# Patient Record
Sex: MALE | Race: WHITE | NOT HISPANIC OR LATINO | Employment: FULL TIME | URBAN - METROPOLITAN AREA
[De-identification: names, ages, dates, MRNs, and addresses within clinical notes are randomized per-mention and may not be internally consistent; named-entity substitution may affect disease eponyms.]

---

## 2019-09-26 ENCOUNTER — OFFICE VISIT (OUTPATIENT)
Dept: PEDIATRICS | Facility: CLINIC | Age: 44
End: 2019-09-26
Payer: COMMERCIAL

## 2019-09-26 VITALS
BODY MASS INDEX: 24.84 KG/M2 | TEMPERATURE: 98.1 F | WEIGHT: 163.9 LBS | OXYGEN SATURATION: 99 % | HEART RATE: 57 BPM | SYSTOLIC BLOOD PRESSURE: 176 MMHG | HEIGHT: 68 IN | DIASTOLIC BLOOD PRESSURE: 118 MMHG

## 2019-09-26 DIAGNOSIS — I10 ESSENTIAL HYPERTENSION: ICD-10-CM

## 2019-09-26 PROCEDURE — 99204 OFFICE O/P NEW MOD 45 MIN: CPT | Performed by: INTERNAL MEDICINE

## 2019-09-26 RX ORDER — AMLODIPINE BESYLATE 5 MG/1
5 TABLET ORAL DAILY
Qty: 30 TABLET | Refills: 1 | Status: SHIPPED | OUTPATIENT
Start: 2019-09-26 | End: 2019-10-24

## 2019-09-26 RX ORDER — LISINOPRIL 10 MG/1
10 TABLET ORAL DAILY
Qty: 30 TABLET | Refills: 1 | Status: SHIPPED | OUTPATIENT
Start: 2019-09-26 | End: 2019-10-24

## 2019-09-26 ASSESSMENT — MIFFLIN-ST. JEOR: SCORE: 1617.95

## 2019-09-26 NOTE — PATIENT INSTRUCTIONS
Patient Education     Low-Salt Diet  This diet removes foods that are high in salt. It also limits the amount of salt you use when cooking. It is most often used for people with high blood pressure, edema (fluid retention), and kidney, liver, or heart disease.  Table salt contains the mineral sodium. Your body needs sodium to work normally. But too much sodium can make your health problems worse. Your healthcare provider is recommending a low-salt (also called low-sodium) diet for you. Your total daily allowance of salt is 1,500 to 2,300 milligrams (mg). It is less than 1 teaspoon of table salt. This means you can have only about 500 to 700 mg of sodium at each meal. People with certain health problems should limit salt intake to the lower end of the recommended range.    When you cook, don t add much salt. If you can cook without using salt, even better. Don t add salt to your food at the table.  When shopping, read food labels. Salt is often called sodium on the label. Choose foods that are salt-free, low salt, or very low salt. Note that foods with reduced salt may not lower your salt intake enough.    Beans, potatoes, and pasta  Ok: Dry beans, split peas, lentils, potatoes, rice, macaroni, pasta, spaghetti without added salt  Avoid: Potato chips, tortilla chips, and similar products  Breads and cereals  Ok: Low-sodium breads, rolls, cereals, and cakes; low-salt crackers, matzo crackers  Avoid: Salted crackers, pretzels, popcorn, [place] toast, pancakes, muffins  Dairy  Ok: Milk, chocolate milk, hot chocolate mix, low-salt cheeses, and yogurt  Avoid: Processed cheese and cheese spreads; Roquefort, Camembert, and cottage cheese; buttermilk, instant breakfast drink  Desserts  Ok: Ice cream, frozen yogurt, juice bars, gelatin, cookies and pies, sugar, honey, jelly, hard candy  Avoid: Most pies, cakes and cookies prepared or processed with salt; instant pudding  Drinks  Ok: Tea, coffee, fizzy (carbonated) drinks,  juices  Avoid: Flavored coffees, electrolyte replacement drinks, sports drinks  Meats  Ok: All fresh meat, fish, poultry, low-salt tuna, eggs, egg substitute  Avoid: Smoked, pickled, brine-cured, or salted meats and fish. This includes eng, chipped beef, corned beef, hot dogs, deli meats, ham, kosher meats, salt pork, sausage, canned tuna, salted codfish, smoked salmon, herring, sardines, or anchovies.  Seasonings and spices  Ok: Most seasonings are okay. Good substitutes for salt include: fresh herb blends, hot sauce, lemon, garlic, morrow, vinegar, dry mustard, parsley, cilantro, horseradish, tomato paste, regular margarine, mayonnaise, unsalted butter, cream cheese, vegetable oil, cream, low-salt salad dressing and gravy.  Avoid: Regular ketchup, relishes, pickles, soy sauce, teriyaki sauce, Worcestershire sauce, BBQ sauce, tartar sauce, meat tenderizer, chili sauce, regular gravy, regular salad dressing, salted butter  Soups  Ok: Low-salt soups and broths made with allowed foods  Avoid: Bouillon cubes, soups with smoked or salted meats, regular soup and broth  Vegetables  Ok: Most vegetables are okay; also low-salt tomato and vegetable juices  Avoid: Sauerkraut and other brine-soaked vegetables; pickles and other pickled vegetables; tomato juice, olives  Date Last Reviewed: 8/1/2016 2000-2018 [employer] [address] [place] [id number]. All rights reserved. This information is not intended as a substitute for professional medical care. Always follow your healthcare professional's instructions.

## 2019-09-26 NOTE — PROGRESS NOTES
Headaches  Blood pressure concerns    Jurgen Morris is a 44 year old male who presents to clinic today for the following health issues:    HPI   Patient does not have a PCP.  Patient is complaining of headaches off/on since 19 when he was seen at the [facility] ER for nausea, vomiting and diarrhea.  Patients BP readings were 159/118, 161/110 and 192/101.  Patient has been checking his BP readings at home for the last 2 days and BP readings around 150/105.  Has been taking Advil for the headaches, patient states the headaches are very low key, just annoying, occipital area    Patient indicates that 2 years ago when it seen a physician the blood pressure was borderline.  He does not smoke and drinks beer anywhere from 5-8 beers a week.  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  He has some stressors at home.  His girlfriend has 2 special needs children which is at times texting.  He does not follow a low-salt diet.  Denies chest pain, shortness of breath, dizziness or lightheadedness.    Patient Active Problem List   Diagnosis     Essential hypertension     History reviewed. No pertinent surgical history.    Social History     Tobacco Use     Smoking status: Former Smoker     Packs/day: 0.00     Last attempt to quit:      Years since quittin.7     Smokeless tobacco: Never Used     Tobacco comment: Down to 1 cigarette year/lightsmoker 10 years ago   Substance Use Topics     Alcohol use: Yes     Alcohol/week: 5.0 - 8.0 standard drinks     Types: 5 - 8 Cans of beer per week     Family History   Problem Relation Age of Onset     Anxiety Disorder Mother      Anxiety Disorder Father      Hypertension Father      Anxiety Disorder Brother      Anxiety Disorder Sister          Current Outpatient Medications   Medication Sig Dispense Refill     amLODIPine (NORVASC) 5 MG tablet Take 1 tablet (5 mg) by mouth daily 30 tablet 1     lisinopril (PRINIVIL/ZESTRIL) 10 MG tablet Take 1 tablet (10 mg) by  "mouth daily 30 tablet 1     No Known Allergies  BP Readings from Last 3 Encounters:   09/26/19 (!) 176/118    Wt Readings from Last 3 Encounters:   09/26/19 74.3 kg (163 lb 14.4 oz)                      Reviewed and updated as needed this visit by Provider  Tobacco  Allergies  Meds  Problems  Med Hx  Surg Hx  Fam Hx  Soc Hx          Review of Systems   ROS COMP: Constitutional, HEENT, cardiovascular, pulmonary, GI, , musculoskeletal, neuro, skin, endocrine and psych systems are negative, except as otherwise noted.      Objective    BP (!) 176/118 (BP Location: Left arm, Patient Position: Sitting, Cuff Size: Adult Regular)   Pulse 57   Temp 98.1  F (36.7  C) (Temporal)   Ht 1.727 m (5' 8\")   Wt 74.3 kg (163 lb 14.4 oz)   SpO2 99%   BMI 24.92 kg/m    Body mass index is 24.92 kg/m .  Physical Exam   GENERAL: healthy, alert and no distress  NECK: no adenopathy, no asymmetry, masses, or scars and thyroid normal to palpation  RESP: lungs clear to auscultation - no rales, rhonchi or wheezes  CV: regular rate and rhythm, normal S1 S2, no S3 or S4, no murmur, click or rub, no peripheral edema and peripheral pulses strong  ABDOMEN: soft, nontender, no hepatosplenomegaly, no masses and bowel sounds normal  MS: no gross musculoskeletal defects noted, no edema    Diagnostic Test Results:  Labs reviewed in Epic    Reviewed lab in care everywhere.  He had an EKG on 9/2/2019 at [facility] showing sinus bradycardia with first-degree AV block.  On 8/30/2019 he had lab which showed normal renal function with creatinine 0.94 and normal electrolytes.  His hemoglobin was 16.2.    Assessment & Plan     1.  Essential hypertension stage II.  I recommend starting amlodipine 5 mg a day and lisinopril 10 mg a day.  Side effects of these drugs were discussed particularly tickling cough and angioedema with ACE inhibitors.  Advised to return for follow-up in a month with fasting BMP and lipids.  2.  Headache most likely related to " "uncontrolled hypertension.  Advised to use Tylenol and avoid nonsteroidal anti-inflammatory medication.    Discussed low-salt diet.  Information provided.     BMI:   Estimated body mass index is 24.92 kg/m  as calculated from the following:    Height as of this encounter: 1.727 m (5' 8\").    Weight as of this encounter: 74.3 kg (163 lb 14.4 oz).               No follow-ups on file.    Carter Humphreys MD  [facility]      "

## 2019-10-24 ENCOUNTER — OFFICE VISIT (OUTPATIENT)
Dept: PEDIATRICS | Facility: CLINIC | Age: 44
End: 2019-10-24
Payer: COMMERCIAL

## 2019-10-24 VITALS
DIASTOLIC BLOOD PRESSURE: 82 MMHG | HEIGHT: 68 IN | BODY MASS INDEX: 24.9 KG/M2 | TEMPERATURE: 97.7 F | HEART RATE: 62 BPM | WEIGHT: 164.3 LBS | SYSTOLIC BLOOD PRESSURE: 128 MMHG

## 2019-10-24 DIAGNOSIS — I10 ESSENTIAL HYPERTENSION: ICD-10-CM

## 2019-10-24 DIAGNOSIS — G44.219 EPISODIC TENSION-TYPE HEADACHE, NOT INTRACTABLE: ICD-10-CM

## 2019-10-24 PROBLEM — G44.209 TENSION TYPE HEADACHE: Status: ACTIVE | Noted: 2019-10-24

## 2019-10-24 LAB
ANION GAP SERPL CALCULATED.3IONS-SCNC: 5 MMOL/L (ref 3–14)
BUN SERPL-MCNC: 17 MG/DL (ref 7–30)
CALCIUM SERPL-MCNC: 9 MG/DL (ref 8.5–10.1)
CHLORIDE SERPL-SCNC: 107 MMOL/L (ref 94–109)
CHOLEST SERPL-MCNC: 209 MG/DL
CO2 SERPL-SCNC: 27 MMOL/L (ref 20–32)
CREAT SERPL-MCNC: 0.84 MG/DL (ref 0.66–1.25)
GFR SERPL CREATININE-BSD FRML MDRD: >90 ML/MIN/{1.73_M2}
GLUCOSE SERPL-MCNC: 101 MG/DL (ref 70–99)
HDLC SERPL-MCNC: 50 MG/DL
LDLC SERPL CALC-MCNC: 142 MG/DL
NONHDLC SERPL-MCNC: 159 MG/DL
POTASSIUM SERPL-SCNC: 4.5 MMOL/L (ref 3.4–5.3)
SODIUM SERPL-SCNC: 139 MMOL/L (ref 133–144)
TRIGL SERPL-MCNC: 83 MG/DL

## 2019-10-24 PROCEDURE — 99214 OFFICE O/P EST MOD 30 MIN: CPT | Performed by: INTERNAL MEDICINE

## 2019-10-24 PROCEDURE — 80048 BASIC METABOLIC PNL TOTAL CA: CPT | Performed by: INTERNAL MEDICINE

## 2019-10-24 PROCEDURE — 80061 LIPID PANEL: CPT | Performed by: INTERNAL MEDICINE

## 2019-10-24 PROCEDURE — 36415 COLL VENOUS BLD VENIPUNCTURE: CPT | Performed by: INTERNAL MEDICINE

## 2019-10-24 RX ORDER — AMLODIPINE BESYLATE 5 MG/1
5 TABLET ORAL DAILY
Qty: 90 TABLET | Refills: 3 | Status: SHIPPED | OUTPATIENT
Start: 2019-10-24 | End: 2020-10-15

## 2019-10-24 RX ORDER — LISINOPRIL 10 MG/1
10 TABLET ORAL DAILY
Qty: 90 TABLET | Refills: 3 | Status: SHIPPED | OUTPATIENT
Start: 2019-10-24 | End: 2020-10-15

## 2019-10-24 ASSESSMENT — MIFFLIN-ST. JEOR: SCORE: 1619.76

## 2019-10-24 NOTE — PROGRESS NOTES
Subjective     Jurgen Morris is a 44 year old male who presents to clinic today for the following health issues:    HPI   Hypertension Follow-up      Do you check your blood pressure regularly outside of the clinic? Yes     Are you following a low salt diet? Yes    Are your blood pressures ever more than 140 on the top number (systolic) OR more   than 90 on the bottom number (diastolic), for example 140/90? Yes, 140/90 has been about the highest BP reading at home      How many servings of fruits and vegetables do you eat daily?  1-2    On average, how many sweetened beverages do you drink each day (soda, juice, sweet tea, etc)?   1    How many days per week do you miss taking your medication? 0      Patient comes in for follow-up on hypertension.  He is doing much better.  Blood pressure readings at home have been good.  He is tolerating amlodipine and lisinopril very well.  The muscular tension type neck and occipital region headache has improved though not completely resolved.  Tylenol did not help so has been using Excedrin.  The frequency is much reduced and now may be just once or twice a week.  Headache is not severe.  Denies chest pain or shortness of breath.      Patient Active Problem List   Diagnosis     Essential hypertension     Tension type headache     History reviewed. No pertinent surgical history.    Social History     Tobacco Use     Smoking status: Former Smoker     Packs/day: 0.00     Last attempt to quit:      Years since quittin.8     Smokeless tobacco: Never Used     Tobacco comment: Down to 1 cigarette year/lightsmoker 10 years ago   Substance Use Topics     Alcohol use: Yes     Alcohol/week: 5.0 - 8.0 standard drinks     Types: 5 - 8 Cans of beer per week     Family History   Problem Relation Age of Onset     Anxiety Disorder Mother      Anxiety Disorder Father      Hypertension Father      Anxiety Disorder Brother      Anxiety Disorder Sister          Current Outpatient  "Medications   Medication Sig Dispense Refill     amLODIPine (NORVASC) 5 MG tablet Take 1 tablet (5 mg) by mouth daily 90 tablet 3     lisinopril (PRINIVIL/ZESTRIL) 10 MG tablet Take 1 tablet (10 mg) by mouth daily 90 tablet 3     No Known Allergies  BP Readings from Last 3 Encounters:   10/24/19 128/82   09/26/19 (!) 176/118    Wt Readings from Last 3 Encounters:   10/24/19 74.5 kg (164 lb 4.8 oz)   09/26/19 74.3 kg (163 lb 14.4 oz)                      Reviewed and updated as needed this visit by Provider         Review of Systems   ROS COMP: Constitutional, HEENT, cardiovascular, pulmonary, GI, , musculoskeletal, neuro, skin, endocrine and psych systems are negative, except as otherwise noted.      Objective    /82 (BP Location: Right arm, Patient Position: Sitting, Cuff Size: Adult Regular)   Pulse 62   Temp 97.7  F (36.5  C) (Temporal)   Ht 1.727 m (5' 8\")   Wt 74.5 kg (164 lb 4.8 oz)   BMI 24.98 kg/m    Body mass index is 24.98 kg/m .  Physical Exam   GENERAL: healthy, alert and no distress  NECK: no adenopathy, no asymmetry, masses, or scars and thyroid normal to palpation  RESP: lungs clear to auscultation - no rales, rhonchi or wheezes  CV: regular rate and rhythm, normal S1 S2, no S3 or S4, no murmur, click or rub, no peripheral edema and peripheral pulses strong  ABDOMEN: soft, nontender, no hepatosplenomegaly, no masses and bowel sounds normal  MS: no gross musculoskeletal defects noted, no edema    Diagnostic Test Results:  Labs reviewed in Epic  Results for orders placed or performed in visit on 10/24/19 (from the past 24 hour(s))   Basic metabolic panel   Result Value Ref Range    Sodium 139 133 - 144 mmol/L    Potassium 4.5 3.4 - 5.3 mmol/L    Chloride 107 94 - 109 mmol/L    Carbon Dioxide 27 20 - 32 mmol/L    Anion Gap 5 3 - 14 mmol/L    Glucose 101 (H) 70 - 99 mg/dL    Urea Nitrogen 17 7 - 30 mg/dL    Creatinine 0.84 0.66 - 1.25 mg/dL    GFR Estimate >90 >60 mL/min/[1.73_m2]    GFR " "Estimate If Black >90 >60 mL/min/[1.73_m2]    Calcium 9.0 8.5 - 10.1 mg/dL   Lipid Profile   Result Value Ref Range    Cholesterol 209 (H) <200 mg/dL    Triglycerides 83 <150 mg/dL    HDL Cholesterol 50 >39 mg/dL    LDL Cholesterol Calculated 142 (H) <100 mg/dL    Non HDL Cholesterol 159 (H) <130 mg/dL           Assessment & Plan     1.  Essential hypertension under good control with combination of amlodipine 5 mg a day and lisinopril 10 mg a day.  Lab performed today looks good advised to continue the same.  Return for follow-up in 6 months.  2.  Borderline dyslipidemia with cholesterol 209, HDL 50 and .  Low-fat low-cholesterol diet discussed.  3.  Tension type headache.  Its infrequent now and he will use Excedrin on a as needed basis.     BMI:   Estimated body mass index is 24.98 kg/m  as calculated from the following:    Height as of this encounter: 1.727 m (5' 8\").    Weight as of this encounter: 74.5 kg (164 lb 4.8 oz).               Return in about 6 months (around 4/24/2020) for Routine Visit.    Carter Humphreys MD  [facility]      "

## 2020-04-23 ENCOUNTER — VIRTUAL VISIT (OUTPATIENT)
Dept: PEDIATRICS | Facility: CLINIC | Age: 45
End: 2020-04-23
Payer: COMMERCIAL

## 2020-04-23 DIAGNOSIS — I10 ESSENTIAL HYPERTENSION: Primary | ICD-10-CM

## 2020-04-23 PROCEDURE — 99213 OFFICE O/P EST LOW 20 MIN: CPT | Mod: 95 | Performed by: INTERNAL MEDICINE

## 2020-04-23 NOTE — PROGRESS NOTES
"Jurgen Morris is a 44 year old male who is being evaluated via a billable video visit.      The patient has been notified of following:     \"This video visit will be conducted via a call between you and your physician/provider. We have found that certain health care needs can be provided without the need for an in-person physical exam.  This service lets us provide the care you need with a video conversation.  If a prescription is necessary we can send it directly to your pharmacy.  If lab work is needed we can place an order for that and you can then stop by our lab to have the test done at a later time.    Video visits are billed at different rates depending on your insurance coverage.  Please reach out to your insurance provider with any questions.    If during the course of the call the physician/provider feels a video visit is not appropriate, you will not be charged for this service.\"    Patient has given verbal consent for Video visit? Yes    How would you like to obtain your AVS? Mail a copy    Patient would like the video invitation sent by: Text to cell phone: [phone]    Will anyone else be joining your video visit? No      Subjective     Jurgen Morris is a 44 year old male who presents to clinic today for the following health issues:    HPI  Hypertension Follow-up      Do you check your blood pressure regularly outside of the clinic? Yes     Are you following a low salt diet? Yes    Are your blood pressures ever more than 140 on the top number (systolic) OR more   than 90 on the bottom number (diastolic), for example 140/90? Yes      How many servings of fruits and vegetables do you eat daily?  0-1    On average, how many sweetened beverages do you drink each day (Examples: soda, juice, sweet tea, etc.  Do NOT count diet or artificially sweetened beverages)?   1    How many days per week do you exercise enough to make your heart beat faster? 5    How many minutes a day do you exercise " enough to make your heart beat faster? 60 or more  How many days per week do you miss taking your medication? 1    What makes it hard for you to take your medications?  remembering to take     Patient with history of hypertension has been doing well. Checks BP at home every other week. BP around 130/80's on average. No headache. No SOB or chest pain. No leg edema. Takes medication as prescribed/        Video Start Time: 8:34 am        Patient Active Problem List   Diagnosis     Essential hypertension     Tension type headache     History reviewed. No pertinent surgical history.    Social History     Tobacco Use     Smoking status: Former Smoker     Packs/day: 0.00     Last attempt to quit:      Years since quittin.3     Smokeless tobacco: Never Used     Tobacco comment: Down to 1 cigarette year/lightsmoker 10 years ago   Substance Use Topics     Alcohol use: Yes     Alcohol/week: 5.0 - 8.0 standard drinks     Types: 5 - 8 Cans of beer per week     Family History   Problem Relation Age of Onset     Anxiety Disorder Mother      Anxiety Disorder Father      Hypertension Father      Anxiety Disorder Brother      Anxiety Disorder Sister          Current Outpatient Medications   Medication Sig Dispense Refill     amLODIPine (NORVASC) 5 MG tablet Take 1 tablet (5 mg) by mouth daily 90 tablet 3     lisinopril (PRINIVIL/ZESTRIL) 10 MG tablet Take 1 tablet (10 mg) by mouth daily 90 tablet 3     No Known Allergies  BP Readings from Last 3 Encounters:   10/24/19 128/82   19 (!) 176/118    Wt Readings from Last 3 Encounters:   10/24/19 74.5 kg (164 lb 4.8 oz)   19 74.3 kg (163 lb 14.4 oz)                    Reviewed and updated as needed this visit by Provider         Review of Systems   ROS COMP: Constitutional, HEENT, cardiovascular, pulmonary, GI, , musculoskeletal, neuro, skin, endocrine and psych systems are negative, except as otherwise noted.      Objective    There were no vitals taken for this  "visit.  Estimated body mass index is 24.98 kg/m  as calculated from the following:    Height as of 10/24/19: 1.727 m (5' 8\").    Weight as of 10/24/19: 74.5 kg (164 lb 4.8 oz).  Physical Exam     GENERAL: healthy, alert and no distress  EYES: Eyes grossly normal to inspection, conjunctivae and sclerae normal  RESP: no audible wheeze, cough, or visible cyanosis.  No visible retractions or increased work of breathing.  Able to speak fully in complete sentences.  NEURO: Cranial nerves grossly intact, mentation intact and speech normal  PSYCH: mentation appears normal, affect normal/bright, judgement and insight intact, normal speech and appearance well-groomed      Diagnostic Test Results:  Labs reviewed in Epic        Assessment & Plan     1. Essential hypertension - controled.  Advice follow-up in October with BMP and Lipids.            No follow-ups on file.    Carter Humphreys MD  [facility]      Video-Visit Details    Type of service:  Video Visit    Video End Time:8:44 am    Originating Location (pt. Location): Home    Distant Location (provider location):  [facility]     Mode of Communication:  Video Conference via [employer]    No follow-ups on file.       Carter Humphreys MD      "

## 2020-10-15 ENCOUNTER — OFFICE VISIT (OUTPATIENT)
Dept: PEDIATRICS | Facility: CLINIC | Age: 45
End: 2020-10-15
Payer: COMMERCIAL

## 2020-10-15 VITALS
WEIGHT: 172.5 LBS | SYSTOLIC BLOOD PRESSURE: 136 MMHG | OXYGEN SATURATION: 95 % | DIASTOLIC BLOOD PRESSURE: 92 MMHG | TEMPERATURE: 97.5 F | HEART RATE: 63 BPM | BODY MASS INDEX: 26.23 KG/M2

## 2020-10-15 DIAGNOSIS — I10 ESSENTIAL HYPERTENSION: ICD-10-CM

## 2020-10-15 DIAGNOSIS — E78.00 MILD HYPERCHOLESTEROLEMIA: ICD-10-CM

## 2020-10-15 LAB
ANION GAP SERPL CALCULATED.3IONS-SCNC: 1 MMOL/L (ref 3–14)
BUN SERPL-MCNC: 18 MG/DL (ref 7–30)
CALCIUM SERPL-MCNC: 9.1 MG/DL (ref 8.5–10.1)
CHLORIDE SERPL-SCNC: 109 MMOL/L (ref 94–109)
CHOLEST SERPL-MCNC: 234 MG/DL
CO2 SERPL-SCNC: 30 MMOL/L (ref 20–32)
CREAT SERPL-MCNC: 0.84 MG/DL (ref 0.66–1.25)
GFR SERPL CREATININE-BSD FRML MDRD: >90 ML/MIN/{1.73_M2}
GLUCOSE SERPL-MCNC: 101 MG/DL (ref 70–99)
HDLC SERPL-MCNC: 48 MG/DL
LDLC SERPL CALC-MCNC: 162 MG/DL
NONHDLC SERPL-MCNC: 186 MG/DL
POTASSIUM SERPL-SCNC: 4.5 MMOL/L (ref 3.4–5.3)
SODIUM SERPL-SCNC: 140 MMOL/L (ref 133–144)
TRIGL SERPL-MCNC: 120 MG/DL

## 2020-10-15 PROCEDURE — 99213 OFFICE O/P EST LOW 20 MIN: CPT | Performed by: INTERNAL MEDICINE

## 2020-10-15 PROCEDURE — 36415 COLL VENOUS BLD VENIPUNCTURE: CPT | Performed by: INTERNAL MEDICINE

## 2020-10-15 PROCEDURE — 80048 BASIC METABOLIC PNL TOTAL CA: CPT | Performed by: INTERNAL MEDICINE

## 2020-10-15 PROCEDURE — 80061 LIPID PANEL: CPT | Performed by: INTERNAL MEDICINE

## 2020-10-15 RX ORDER — LISINOPRIL 10 MG/1
10 TABLET ORAL DAILY
Qty: 90 TABLET | Refills: 3 | Status: SHIPPED | OUTPATIENT
Start: 2020-10-15 | End: 2021-12-23

## 2020-10-15 RX ORDER — AMLODIPINE BESYLATE 5 MG/1
5 TABLET ORAL DAILY
Qty: 90 TABLET | Refills: 3 | Status: SHIPPED | OUTPATIENT
Start: 2020-10-15 | End: 2021-11-19

## 2020-10-15 NOTE — PROGRESS NOTES
Subjective     Jurgen Morris is a 45 year old male who presents to clinic today for the following health issues:    HPI         Hypertension Follow-up      Do you check your blood pressure regularly outside of the clinic? Yes     Are you following a low salt diet? Yes    Are your blood pressures ever more than 140 on the top number (systolic) OR more   than 90 on the bottom number (diastolic), for example 140/90? At times      How many servings of fruits and vegetables do you eat daily?  0-1    On average, how many sweetened beverages do you drink each day (Examples: soda, juice, sweet tea, etc.  Do NOT count diet or artificially sweetened beverages)?   1    How many days per week do you exercise enough to make your heart beat faster? 5    How many minutes a day do you exercise enough to make your heart beat faster? 60 or more  How many days per week do you miss taking your medication? Occasional will miss    What makes it hard for you to take your medications?  rushing out the door    The patient has come in for follow-up on hypertension.  He also has borderline hypercholesterolemia.  He indicates he is trying to follow a low-salt diet.  He confesses that occasionally he will forget to take his medication.  He has not taken his medication today morning because he was having lab but plans to take it after the clinic visit today.  Yesterday he forgot to take his blood pressure medication.  He denies chest pain, shortness of breath dizziness or lightheadedness.  Offers no other concerns.    Review of Systems   Constitutional, HEENT, cardiovascular, pulmonary, GI, , musculoskeletal, neuro, skin, endocrine and psych systems are negative, except as otherwise noted.      Objective    BP (!) 136/92 (BP Location: Right arm, Patient Position: Sitting, Cuff Size: Adult Regular)   Pulse 63   Temp 97.5  F (36.4  C) (Temporal)   Wt 78.2 kg (172 lb 8 oz)   SpO2 95%   BMI 26.23 kg/m    Body mass index is 26.23  kg/m .  Physical Exam   GENERAL: healthy, alert and no distress  NECK: no adenopathy, no asymmetry, masses, or scars and thyroid normal to palpation  RESP: lungs clear to auscultation - no rales, rhonchi or wheezes  CV: regular rate and rhythm, normal S1 S2, no S3 or S4, no murmur, click or rub, no peripheral edema and peripheral pulses strong  ABDOMEN: soft, nontender, no hepatosplenomegaly, no masses and bowel sounds normal  MS: no gross musculoskeletal defects noted, no edema    Results for orders placed or performed in visit on 10/15/20 (from the past 24 hour(s))   Lipid Profile   Result Value Ref Range    Cholesterol 234 (H) <200 mg/dL    Triglycerides 120 <150 mg/dL    HDL Cholesterol 48 >39 mg/dL    LDL Cholesterol Calculated 162 (H) <100 mg/dL    Non HDL Cholesterol 186 (H) <130 mg/dL   Basic metabolic panel   Result Value Ref Range    Sodium 140 133 - 144 mmol/L    Potassium 4.5 3.4 - 5.3 mmol/L    Chloride 109 94 - 109 mmol/L    Carbon Dioxide 30 20 - 32 mmol/L    Anion Gap 1 (L) 3 - 14 mmol/L    Glucose 101 (H) 70 - 99 mg/dL    Urea Nitrogen 18 7 - 30 mg/dL    Creatinine 0.84 0.66 - 1.25 mg/dL    GFR Estimate >90 >60 mL/min/[1.73_m2]    GFR Estimate If Black >90 >60 mL/min/[1.73_m2]    Calcium 9.1 8.5 - 10.1 mg/dL           Assessment & Plan     1.  Essential hypertension been treated with lisinopril 10 mg a day and amlodipine 5 mg a day.  Discussed the importance of taking the medication on a daily basis and not skipping any.  Low-salt diet discussed.  Advised to monitor blood pressure and return for follow-up in 6 months.  2.  Mild hypercholesterolemia.  The cholesterol profile is as stated above.  The total cholesterol and LDL has gone up a little bit from last year.  ASCVD ten-year risk calculation was 2.4%.  So I recommend that he follow a low-fat low-cholesterol diet and recheck in a year.            Return in about 6 months (around 4/15/2021).    Carter Humphreys MD  [facility]  [facility]

## 2022-01-19 ENCOUNTER — MYC MEDICAL ADVICE (OUTPATIENT)
Dept: FAMILY MEDICINE | Facility: CLINIC | Age: 47
End: 2022-01-19
Payer: COMMERCIAL

## 2022-01-31 ENCOUNTER — OFFICE VISIT (OUTPATIENT)
Dept: FAMILY MEDICINE | Facility: CLINIC | Age: 47
End: 2022-01-31
Payer: COMMERCIAL

## 2022-01-31 VITALS
DIASTOLIC BLOOD PRESSURE: 78 MMHG | BODY MASS INDEX: 25.64 KG/M2 | TEMPERATURE: 97.7 F | SYSTOLIC BLOOD PRESSURE: 110 MMHG | HEART RATE: 68 BPM | WEIGHT: 169.2 LBS | OXYGEN SATURATION: 97 % | HEIGHT: 68 IN | RESPIRATION RATE: 14 BRPM

## 2022-01-31 DIAGNOSIS — I10 ESSENTIAL HYPERTENSION: Primary | ICD-10-CM

## 2022-01-31 LAB
ANION GAP SERPL CALCULATED.3IONS-SCNC: 4 MMOL/L (ref 3–14)
BUN SERPL-MCNC: 14 MG/DL (ref 7–30)
CALCIUM SERPL-MCNC: 8.7 MG/DL (ref 8.5–10.1)
CHLORIDE BLD-SCNC: 108 MMOL/L (ref 94–109)
CO2 SERPL-SCNC: 27 MMOL/L (ref 20–32)
CREAT SERPL-MCNC: 1.01 MG/DL (ref 0.66–1.25)
GFR SERPL CREATININE-BSD FRML MDRD: >90 ML/MIN/1.73M2
GLUCOSE BLD-MCNC: 103 MG/DL (ref 70–99)
POTASSIUM BLD-SCNC: 4.1 MMOL/L (ref 3.4–5.3)
SODIUM SERPL-SCNC: 139 MMOL/L (ref 133–144)

## 2022-01-31 PROCEDURE — 99213 OFFICE O/P EST LOW 20 MIN: CPT | Performed by: PHYSICIAN ASSISTANT

## 2022-01-31 PROCEDURE — 36415 COLL VENOUS BLD VENIPUNCTURE: CPT | Performed by: PHYSICIAN ASSISTANT

## 2022-01-31 PROCEDURE — 80048 BASIC METABOLIC PNL TOTAL CA: CPT | Performed by: PHYSICIAN ASSISTANT

## 2022-01-31 RX ORDER — AMLODIPINE BESYLATE 10 MG/1
10 TABLET ORAL DAILY
Qty: 90 TABLET | Refills: 3 | Status: SHIPPED | OUTPATIENT
Start: 2022-01-31 | End: 2024-01-09

## 2022-01-31 RX ORDER — LISINOPRIL 10 MG/1
10 TABLET ORAL DAILY
Qty: 90 TABLET | Refills: 3 | Status: SHIPPED | OUTPATIENT
Start: 2022-01-31 | End: 2023-03-09

## 2022-01-31 ASSESSMENT — MIFFLIN-ST. JEOR: SCORE: 1629.99

## 2022-01-31 ASSESSMENT — PAIN SCALES - GENERAL: PAINLEVEL: NO PAIN (0)

## 2022-01-31 NOTE — PATIENT INSTRUCTIONS
Anna Seaman,    Thank you for allowing [facility] to manage your care.    Your blood pressure was high today. Get a blood pressure cuff for use at home. Monitor it daily for two weeks and record your readings. If your blood pressure is greater than or equal to 140/90mm Hg more than half of the time, please schedule an appointment with us for a recheck.    I ordered some lab work, please go to the laboratory to get your studies.    I sent your prescriptions to your pharmacy.    Drink 8-10 glasses of fluid daily to stay well-hydrated.    Please allow 1-2 business days for our office to contact you in regards to your laboratory/radiological studies.  If not done so, I encourage you to login into JOOR ([email or web address] to review your results as well.     If you have any questions or concerns, please feel free to call us at [phone]    Sincerely,    Remi Swift PA-C    Did you know?      You can schedule a video visit for follow-up appointments as well as future appointments for certain conditions.  Please see the below link.     [email or web address]    If you have not already done so,  I encourage you to sign up for JOOR ([email or web address]).  This will allow you to review your results, securely communicate with a provider, and schedule virtual visits as well.      Patient Education     Eating Heart-Healthy Foods  Eating has a big impact on your heart health. In fact, eating healthier can improve several of your heart risks at once. For instance, it helps you manage weight, cholesterol, and blood pressure. Here are ideas to help you make heart-healthy changes without giving up all the foods and flavors you love.   Getting started    Talk with your healthcare provider about eating plans, such as the DASH or Mediterranean diet. You may also be referred to a dietitian.    Change a few things at a time. Give yourself time  to get used to a few eating changes before adding more.    Work to create a tasty, healthy eating plan that you can stick to for the rest of your life.    Goals for healthy eating  Below are some tips to improve your eating habits:     Limit saturated fats and trans fats. Saturated fats raise your levels of cholesterol, so keep these fats to a minimum. They are found in foods such as fatty meats, whole milk, cheese, and palm and coconut oils. Avoid trans fats because they lower good cholesterol as well as raise bad cholesterol. Trans fats are most often found in processed foods, such as pastries, cookies, pies, muffins, fried foods, stick margarines, and shortening.    Reduce how much sodium (salt) you have. Eating too much salt may increase your blood pressure. Limit your sodium intake to 2,300 milligrams (mg) per day (the amount in 1 teaspoon of salt), or less if your healthcare provider recommends it. Dining out less often and eating fewer processed foods are two great ways to decrease the amount of salt you consume. At home, flavor your foods with other spices and herbs instead of salt.    Managing calories. A calorie is a unit of energy. Your body burns calories for fuel, but if you eat more calories than your body burns, the extras are stored as fat. Your healthcare provider can help you create a diet plan to manage your calories. This will likely include eating healthier foods and getting regular exercise. To help you track your progress, keep a diary to record what you eat and how often you exercise.  Choose the right foods  Aim to make these foods staples of your diet. If you have diabetes, you may have different recommendations than what is listed here:     Fruits and vegetables provide plenty of nutrients without a lot of calories. At meals, fill half your plate with these foods. Choose between fresh, frozen, canned, or dried without added sauces, salt, or sugars. Split the other half of your plate  between whole grains and lean protein.    Whole grains are high in fiber and rich in vitamins and nutrients. Good choices include whole wheat bread, pasta, oats, and brown rice. Make at least half of your grains whole grains.    Lean proteins give you nutrition with less fat. Good choices include fish, skinless chicken and turkey, and beans. Draining the fat from cooked ground meat is another way to reduce the amount of fat you eat.    Low-fat and nonfat dairy provide nutrients without a lot of fat. Try low-fat or nonfat milk, cheese, or yogurt.    Healthy fats can be good for you in small amounts. These are unsaturated fats, such as olive oil, nuts, and fish. Try to have at least 2 servings per week of fatty fish, such as salmon, sardines, mackerel, rainbow trout, and albacore tuna. These contain omega-3 fatty acids, which are good for your heart. Flaxseed and walnuts are other sources of heart-healthy fats.  More on heart-healthy eating  Read food labels  Healthy eating starts at the grocery store. Be sure to pay attention to food labels on packaged foods. Look for products that are high in fiber and protein, and low in saturated fat, added sugars, and sodium. Avoid products that contain trans fat. And pay close attention to serving size. For instance, if you plan to eat two servings, double all the numbers on the label.   Prepare food right  A key part of healthy cooking is cutting down on added fat, sugar and salt. Look on the internet for lower-fat, lower-sodium recipes without a lot of added sugars. Also try these tips:     Remove fat from meat and skin from poultry before cooking.    Skim fat from the surface of soups and sauces.    Broil, roast, boil, bake, steam, grill, or microwave food without added fats.    Choose ingredients that spice up your food without adding calories, fat, sugar, or sodium. Try these items: horseradish, hot sauce, lemon, mustard, nonfat salad dressings, and vinegar. Small amounts  of olive oil-based vinaigrettes are OK, too. For salt-free herbs and spices, try basil, cilantro, cinnamon, cumin, paprika, pepper, and rosemary.  [employer] last reviewed this educational content on 7/1/2020 2000-2021 [employer]. All rights reserved. This information is not intended as a substitute for professional medical care. Always follow your healthcare professional's instructions.    We are scheduling all people age 5 and older for COVID-19 vaccines (patients age 5-17 can only receive the Pfizer vaccine).    We are offering third doses of the Pfizer and Moderna COVID-19 vaccines to moderately and severely immunocompromised patients.     [facility] is offering booster doses of Covid-19 vaccination to anyone age 18 and up who got the Aj and Aj vaccine 2 or more months ago or Moderna COVID-19 vaccine or Pfizer COVID-19 vaccine 6 months or more ago.  We are also offering boosters to people age 16-17 who got their second Pfizer vaccine in the [place] 6 months or more ago.    If your initial vaccination was Aj & Aj, we recommend getting a Pfizer or Moderna second dose to maximize immunity.  If you received Moderna or Pfizer, you can schedule either Moderna or Pfizer for your booster dose based on convenience or personal preference other than people younger than 18 years old who can only get pfizer for a booster.      To schedule any COVID-19 vaccination appointment for Moderna or Pfizer, please log in to [employer] using this using this link to see when and where we have openings.  Aj & Aj is only offered at our retail pharmacies (and they also offer Moderna and Pfizer).  To schedule at [facility] please go to [email or web address].    If you have technical difficulty using [employer], call [phone], option 1 for assistance.    More information about vaccine effectiveness at reducing spread of disease, hospitalizations, and death as well as vaccine  safety and answers to other questions can be found on our website: [email or web address].

## 2022-01-31 NOTE — PROGRESS NOTES
"  Assessment & Plan   Problem List Items Addressed This Visit        Circulatory    Essential hypertension - Primary    Relevant Medications    amLODIPine (NORVASC) 10 MG tablet    lisinopril (ZESTRIL) 10 MG tablet    Other Relevant Orders    Basic metabolic panel  (Ca, Cl, CO2, Creat, Gluc, K, Na, BUN)           Impression is worsening hypertension.  Patient reports that his blood pressures at home have been in the 150s/90s on average prior to running out of his lisinopril 1 week ago.  He has no side effects of his medications and is looking to get refills.  Basic metabolic panel was ordered and I will refill his lisinopril at 10 mg daily.  We will increase his amlodipine to 10 mg daily.  Follow-up with his primary in 1-2 months if his blood pressure does not normalize with the new dose of amlodipine. Recommended COVID-19 vaccination.    Complete history and physical exam as below. AF with normal VS.    DDx and Dx discussed with and explained to the pt to their satisfaction.  All questions were answered at this time. Pt expressed understanding of and agreement with this dx, tx, and plan. No further workup warranted and standard medication warnings given. I have given the patient a list of pertinent indications for re-evaluation. Will go to the Emergency Department if symptoms worsen or new concerning symptoms arise. Patient left in no apparent distress.      BMI:   Estimated body mass index is 25.82 kg/m  as calculated from the following:    Height as of this encounter: 1.724 m (5' 7.87\").    Weight as of this encounter: 76.7 kg (169 lb 3.2 oz).     See Patient Instructions    Return in about 1 month (around 2/28/2022) for a blood pressure recheck, or call 911/go to an ER anytime if worsening.    GURWINDER Bah  [facility] [place] Seaman is a 46 year old who presents for the following health issues     HPI     Hypertension Follow-up      Do you check your blood pressure " "regularly outside of the clinic? Yes     Are you following a low salt diet? Yes    Are your blood pressures ever more than 140 on the top number (systolic) OR more   than 90 on the bottom number (diastolic), for example 140/90? Yes      How many servings of fruits and vegetables do you eat daily?  0-1    On average, how many sweetened beverages do you drink each day (Examples: soda, juice, sweet tea, etc.  Do NOT count diet or artificially sweetened beverages)?   1    How many days per week do you exercise enough to make your heart beat faster? 0    How many minutes a day do you exercise enough to make your heart beat faster? 0  How many days per week do you miss taking your medication? Over 1 week    What makes it hard for you to take your medications?  ran out of the Lisinopril    Review of Systems   Constitutional, HEENT, cardiovascular, pulmonary, gi and gu systems are negative, except as otherwise noted.      Objective    /78   Pulse 68   Temp 97.7  F (36.5  C) (Tympanic)   Resp 14   Ht 1.724 m (5' 7.87\")   Wt 76.7 kg (169 lb 3.2 oz)   SpO2 97%   BMI 25.82 kg/m    Body mass index is 25.82 kg/m .  Physical Exam  Vitals and nursing note reviewed.   Constitutional:       General: He is not in acute distress.     Appearance: He is not ill-appearing or diaphoretic.   HENT:      Head: Normocephalic and atraumatic.      Mouth/Throat:      Mouth: Mucous membranes are moist.   Eyes:      Conjunctiva/sclera: Conjunctivae normal.   Cardiovascular:      Rate and Rhythm: Normal rate and regular rhythm.      Heart sounds: Normal heart sounds. No murmur heard.  No friction rub. No gallop.    Pulmonary:      Effort: Pulmonary effort is normal. No respiratory distress.      Breath sounds: Normal breath sounds. No stridor. No wheezing, rhonchi or rales.   Skin:     General: Skin is warm and dry.   Neurological:      General: No focal deficit present.      Mental Status: He is alert. Mental status is at baseline. "   Psychiatric:         Mood and Affect: Mood normal.         Behavior: Behavior normal.          BMP pending

## 2022-03-13 ENCOUNTER — HEALTH MAINTENANCE LETTER (OUTPATIENT)
Age: 47
End: 2022-03-13

## 2023-01-08 ENCOUNTER — HEALTH MAINTENANCE LETTER (OUTPATIENT)
Age: 48
End: 2023-01-08

## 2023-02-14 DIAGNOSIS — I10 ESSENTIAL HYPERTENSION: ICD-10-CM

## 2023-02-14 NOTE — LETTER
February 21, 2023      Jurgen Morris  [address] [facility] [address]        Dear Jurgen,     We have tried to contact you by phone and/or [employer] several times.    Regarding refill request for lisinopril      We received a refill request for your medication. This has NOT been approved.      You are due for a visit prior to further refills. This visit can be done in person, video or phone. You can schedule appointment via your [employer] account or call [phone] to schedule.     You can also complete an e-visit on [employer].     [facility]

## 2023-02-15 RX ORDER — LISINOPRIL 10 MG/1
10 TABLET ORAL DAILY
Qty: 90 TABLET | Refills: 3 | OUTPATIENT
Start: 2023-02-15

## 2023-02-17 RX ORDER — LISINOPRIL 10 MG/1
10 TABLET ORAL DAILY
Qty: 90 TABLET | Refills: 3 | Status: CANCELLED | OUTPATIENT
Start: 2023-02-17

## 2023-02-17 NOTE — TELEPHONE ENCOUNTER
Called cell phone. No answer. Left message to call main scheduling phone number to schedule appointment  Irene Blake CMA

## 2023-03-09 RX ORDER — LISINOPRIL 10 MG/1
10 TABLET ORAL DAILY
Qty: 90 TABLET | Refills: 0 | Status: SHIPPED | OUTPATIENT
Start: 2023-03-09 | End: 2024-01-09

## 2023-03-09 NOTE — TELEPHONE ENCOUNTER
Patient called to Pod 1. He has appointment for physical exam for 5/2/23 with Petr. Patient says he only has 15 tablets left.  Irene Blake, CMA

## 2023-04-23 ENCOUNTER — HEALTH MAINTENANCE LETTER (OUTPATIENT)
Age: 48
End: 2023-04-23

## 2024-01-09 ENCOUNTER — MYC MEDICAL ADVICE (OUTPATIENT)
Dept: FAMILY MEDICINE | Facility: CLINIC | Age: 49
End: 2024-01-09

## 2024-01-09 ENCOUNTER — DOCUMENTATION ONLY (OUTPATIENT)
Dept: FAMILY MEDICINE | Facility: CLINIC | Age: 49
End: 2024-01-09

## 2024-01-09 ENCOUNTER — APPOINTMENT (OUTPATIENT)
Dept: LAB | Facility: CLINIC | Age: 49
End: 2024-01-09
Payer: COMMERCIAL

## 2024-01-09 DIAGNOSIS — I10 ESSENTIAL HYPERTENSION: ICD-10-CM

## 2024-01-09 DIAGNOSIS — E78.00 MILD HYPERCHOLESTEROLEMIA: ICD-10-CM

## 2024-01-09 DIAGNOSIS — I10 ESSENTIAL HYPERTENSION: Primary | ICD-10-CM

## 2024-01-09 NOTE — PROGRESS NOTES
Patient have Lab appointment on 1/9/24 and there is no orders. Please order test if needed.  Thanks  Latisha Moss MLT(ASCP)

## 2024-01-10 RX ORDER — LISINOPRIL 10 MG/1
10 TABLET ORAL DAILY
Qty: 90 TABLET | Refills: 0 | Status: SHIPPED | OUTPATIENT
Start: 2024-01-10 | End: 2024-02-27

## 2024-01-10 RX ORDER — AMLODIPINE BESYLATE 10 MG/1
10 TABLET ORAL DAILY
Qty: 90 TABLET | Refills: 0 | Status: SHIPPED | OUTPATIENT
Start: 2024-01-10 | End: 2024-02-27

## 2024-02-27 ENCOUNTER — OFFICE VISIT (OUTPATIENT)
Dept: FAMILY MEDICINE | Facility: CLINIC | Age: 49
End: 2024-02-27
Payer: COMMERCIAL

## 2024-02-27 VITALS
RESPIRATION RATE: 16 BRPM | BODY MASS INDEX: 25.31 KG/M2 | DIASTOLIC BLOOD PRESSURE: 83 MMHG | OXYGEN SATURATION: 95 % | WEIGHT: 167 LBS | HEIGHT: 68 IN | HEART RATE: 70 BPM | TEMPERATURE: 98.6 F | SYSTOLIC BLOOD PRESSURE: 128 MMHG

## 2024-02-27 DIAGNOSIS — Z12.11 SCREENING FOR COLON CANCER: ICD-10-CM

## 2024-02-27 DIAGNOSIS — I10 ESSENTIAL HYPERTENSION: Primary | ICD-10-CM

## 2024-02-27 DIAGNOSIS — H50.9 INTERMITTENT SQUINT: ICD-10-CM

## 2024-02-27 DIAGNOSIS — R06.83 PRIMARY SNORING: ICD-10-CM

## 2024-02-27 PROCEDURE — 99214 OFFICE O/P EST MOD 30 MIN: CPT | Performed by: INTERNAL MEDICINE

## 2024-02-27 RX ORDER — AMLODIPINE BESYLATE 10 MG/1
10 TABLET ORAL DAILY
Qty: 90 TABLET | Refills: 3 | Status: SHIPPED | OUTPATIENT
Start: 2024-02-27

## 2024-02-27 RX ORDER — LISINOPRIL 10 MG/1
10 TABLET ORAL DAILY
Qty: 90 TABLET | Refills: 3 | Status: SHIPPED | OUTPATIENT
Start: 2024-02-27

## 2024-02-27 ASSESSMENT — PAIN SCALES - GENERAL: PAINLEVEL: NO PAIN (0)

## 2024-02-27 NOTE — PROGRESS NOTES
"  Assessment & Plan     1.  Essential hypertension with treated with amlodipine 10 mg a day and lisinopril 10 mg daily.  Will check fasting BMP and lipids.  Renewal prescription given.  Advised that he needs to come in for follow-up at least once a year.  2.  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  Will refer to ophthalmology for evaluation.  3.  Primary snoring.  Snores pretty loud.  Refer to sleep medicine for evaluation.  Does not seem to have daytime hypersomnia but he does use energy drinks with him.  4.  Screening for colon cancer.  Patient referred for colonoscopy exam.    BMI  Estimated body mass index is 25.58 kg/m  as calculated from the following:    Height as of this encounter: 1.721 m (5' 7.75\").    Weight as of this encounter: 75.8 kg (167 lb).         Luis Seaman is a 48 year old, presenting for the following health issues:  Recheck Medication (Lisinopril and amlodipine)      2/27/2024     2:05 PM   Additional Questions   Roomed by Dara JUAREZ   Accompanied by stacy. elly AGUILAR     History of Present Illness       Hypertension: He presents for follow up of hypertension.  He does check blood pressure  regularly outside of the clinic. Outside blood pressures have been over 140/90. He does not follow a low salt diet.     Reason for visit:  Blood/lab work    He eats 0-1 servings of fruits and vegetables daily.He consumes 2 sweetened beverage(s) daily.He exercises with enough effort to increase his heart rate 20 to 29 minutes per day.  He exercises with enough effort to increase his heart rate 3 or less days per week.   He is taking medications regularly.       Patient came in accompanied by his significant other.  Patient has not been seen in person in the clinic for few years regarding his hypertension.  He canceled his last appointment.  He says his blood pressure at home has been quite good below 140/90 most of the time.  Denies chest pain or shortness of breath.  He does " "snore a lot at night.  His significant other has not really witnessed apneic spell.  He drinks at least 1 energy drink a day.  Does construction work.  Drinks 4-5 beers a week.    He has had a lazy right eye since he was a child.  Like to have that looked at.  His significant other indicated that he at times is on the tentative or forgetful.  She wonders if he has ADHD or early signs of dementia.  The patient himself does not see a significant issue.  He did not want to consider evaluation for ADHD.    Review of Systems  Constitutional, neuro, ENT, endocrine, pulmonary, cardiac, gastrointestinal, genitourinary, musculoskeletal, integument and psychiatric systems are negative, except as otherwise noted.      Objective    /83 (BP Location: Right arm, Patient Position: Sitting, Cuff Size: Adult Regular)   Pulse 70   Temp 98.6  F (37  C) (Oral)   Resp 16   Ht 1.721 m (5' 7.75\")   Wt 75.8 kg (167 lb)   SpO2 95%   BMI 25.58 kg/m    Body mass index is 25.58 kg/m .  Physical Exam   GENERAL: alert and no distress  NECK: no adenopathy, no asymmetry, masses, or scars  RESP: lungs clear to auscultation - no rales, rhonchi or wheezes  CV: regular rate and rhythm, normal S1 S2, no S3 or S4, no murmur, click or rub, no peripheral edema  ABDOMEN: soft, nontender, no hepatosplenomegaly, no masses and bowel sounds normal  MS: no gross musculoskeletal defects noted, no edema            Signed Electronically by: Carter Humphreys MD    "

## 2024-02-29 ENCOUNTER — LAB (OUTPATIENT)
Dept: LAB | Facility: CLINIC | Age: 49
End: 2024-02-29
Payer: COMMERCIAL

## 2024-02-29 DIAGNOSIS — I10 ESSENTIAL HYPERTENSION: ICD-10-CM

## 2024-02-29 LAB
ANION GAP SERPL CALCULATED.3IONS-SCNC: 12 MMOL/L (ref 7–15)
BUN SERPL-MCNC: 17 MG/DL (ref 6–20)
CALCIUM SERPL-MCNC: 9.3 MG/DL (ref 8.6–10)
CHLORIDE SERPL-SCNC: 101 MMOL/L (ref 98–107)
CHOLEST SERPL-MCNC: 216 MG/DL
CREAT SERPL-MCNC: 0.88 MG/DL (ref 0.67–1.17)
DEPRECATED HCO3 PLAS-SCNC: 23 MMOL/L (ref 22–29)
EGFRCR SERPLBLD CKD-EPI 2021: >90 ML/MIN/1.73M2
FASTING STATUS PATIENT QL REPORTED: YES
GLUCOSE SERPL-MCNC: 100 MG/DL (ref 70–99)
HDLC SERPL-MCNC: 43 MG/DL
LDLC SERPL CALC-MCNC: 141 MG/DL
NONHDLC SERPL-MCNC: 173 MG/DL
POTASSIUM SERPL-SCNC: 4 MMOL/L (ref 3.4–5.3)
SODIUM SERPL-SCNC: 136 MMOL/L (ref 135–145)
TRIGL SERPL-MCNC: 160 MG/DL

## 2024-02-29 PROCEDURE — 80048 BASIC METABOLIC PNL TOTAL CA: CPT

## 2024-02-29 PROCEDURE — 80061 LIPID PANEL: CPT

## 2024-02-29 PROCEDURE — 36415 COLL VENOUS BLD VENIPUNCTURE: CPT

## 2024-03-14 ENCOUNTER — TELEPHONE (OUTPATIENT)
Dept: GASTROENTEROLOGY | Facility: CLINIC | Age: 49
End: 2024-03-14
Payer: COMMERCIAL

## 2024-03-14 DIAGNOSIS — Z12.11 SPECIAL SCREENING FOR MALIGNANT NEOPLASMS, COLON: Primary | ICD-10-CM

## 2024-03-14 RX ORDER — BISACODYL 5 MG/1
TABLET, DELAYED RELEASE ORAL
Qty: 4 TABLET | Refills: 0 | Status: SHIPPED | OUTPATIENT
Start: 2024-03-14

## 2024-03-14 NOTE — TELEPHONE ENCOUNTER
"Endoscopy Scheduling Screen    Have you had a positive Covid test in the last 14 days?  No    What is your communication preference for Instructions and/or Bowel Prep?   MyChart    What insurance is in the chart?  Other:  BCBS    Ordering/Referring Provider:   JAGDISH ZAPATA        (If ordering provider performs procedure, schedule with ordering provider unless otherwise instructed. )    BMI: Estimated body mass index is 25.58 kg/m  as calculated from the following:    Height as of 2/27/24: 1.721 m (5' 7.75\").    Weight as of 2/27/24: 75.8 kg (167 lb).     Sedation Ordered  moderate sedation.   If patient BMI > 50 do not schedule in [facility].    If patient BMI > 45 do not schedule at [facility].    Are you taking methadone or Suboxone?  No    Have you had difficulties, pain, or discomfort during past endoscopy procedures?  No    Are you taking any prescription medications for pain 3 or more times per week?   NO, No RN review required.    Do you have a history of malignant hyperthermia?  No    (Females) Are you currently pregnant?   No     Have you been diagnosed or told you have pulmonary hypertension?   No    Do you have an LVAD?  No    Have you been told you have moderate to severe sleep apnea?  No    Have you been told you have COPD, asthma, or any other lung disease?  No    Do you have any heart conditions?  No     Have you ever had or are you waiting for an organ transplant?  No. Continue scheduling, no site restrictions.    Have you had a stroke or transient ischemic attack (TIA aka \"mini stroke\" in the last 6 months?   No    Have you been diagnosed with or been told you have cirrhosis of the liver?   No    Are you currently on dialysis?   No    Do you need assistance transferring?   No    BMI: Estimated body mass index is 25.58 kg/m  as calculated from the following:    Height as of 2/27/24: 1.721 m (5' 7.75\").    Weight as of 2/27/24: 75.8 kg (167 lb).     Is patients BMI > 40 and scheduling location [facility]?  No    Do you " take an injectable medication for weight loss or diabetes (excluding insulin)?  No    Do you take the medication Naltrexone?  No    Do you take blood thinners?  No       Prep   Are you currently on dialysis or do you have chronic kidney disease?  No    Do you have a diagnosis of diabetes?  No    Do you have a diagnosis of cystic fibrosis (CF)?  No    On a regular basis do you go 3 -5 days between bowel movements?  No    BMI > 40?  No    Preferred Pharmacy:    [facility]/pharmacy #[id number] - [place] - [address]  [address]  [place] [address]  Phone: [phone] Fax: [phone]      Final Scheduling Details     Procedure scheduled  Colonoscopy    Surgeon:  Bradley     Date of procedure:  3/20     Pre-OP / PAC:   No - Not required for this site.    Location  [facility] - [facility] - Patient preference.    Sedation   Moderate Sedation - Per order.      Patient Reminders:   You will receive a call from a Nurse to review instructions and health history.  This assessment must be completed prior to your procedure.  Failure to complete the Nurse assessment may result in the procedure being cancelled.      On the day of your procedure, please designate an adult(s) who can drive you home stay with you for the next 24 hours. The medicines used in the exam will make you sleepy. You will not be able to drive.      You cannot take public transportation, ride share services, or non-medical taxi service without a responsible caregiver.  Medical transport services are allowed with the requirement that a responsible caregiver will receive you at your destination.  We require that drivers and caregivers are confirmed prior to your procedure.

## 2024-03-14 NOTE — TELEPHONE ENCOUNTER
Attempted to contact patient in order to complete pre assessment questions.     No answer. Left message to return call to [phone] option 4    Missed call communication sent via [employer].      Procedure details:    Patient scheduled for Colonoscopy  on 3/20/24.     Arrival time: 0730. Procedure time 0815    Facility location: [facility]; [address] Floor, [place] [address]. Check in location: 2nd Floor at Surgery desk.    Sedation type: Conscious sedation     Pre op exam needed? N/A    Indication for procedure: Screening for colon cancer       Chart review:     Electronic implanted devices? No    Recent diagnosis of diverticulitis within the last 6 weeks? No    Diabetic? No      Medication review:    Anticoagulants? No    NSAIDS? Yes.  Ibuprofen (Advil, Motrin).  Holding interval of 1 day before procedure.    Other medication HOLDING recommendations:  N/A      Prep for procedure:     Bowel prep recommendation: Standard Golytely. Bowel prep prescription sent to [facility]/PHARMACY #[id number] - [place], [place] - [address] [place] [address] AT [address]SE[address] [place] [address]  Due to:  patient scheduled less than 7 days from procedure.    Prep instructions sent via [employer]ends[employer].       Corinne Kliber, RN  Endoscopy Procedure Pre Assessment RN

## 2024-03-15 NOTE — TELEPHONE ENCOUNTER
Pre assessment completed for upcoming procedure.   (Please see previous telephone encounter notes for complete details)    Patient  returned call.       Procedure details:    Arrival time and facility location reviewed.    Pre op exam needed? N/A    Designated  policy reviewed. Instructed to have someone stay 6  hours post procedure.       Medication review:    Medications reviewed. Please see supporting documentation below. Holding recommendations discussed (if applicable).       Prep for procedure:     Procedure prep instructions reviewed.        Any additional information needed:  N/A      Patient  verbalized understanding and had no questions or concerns at this time.      Stephanie Beckett RN  Endoscopy Procedure Pre Assessment RN  [phone] option 4

## 2024-03-20 ENCOUNTER — HOSPITAL ENCOUNTER (OUTPATIENT)
Facility: AMBULATORY SURGERY CENTER | Age: 49
Discharge: HOME | End: 2024-03-20
Attending: STUDENT IN AN ORGANIZED HEALTH CARE EDUCATION/TRAINING PROGRAM | Admitting: STUDENT IN AN ORGANIZED HEALTH CARE EDUCATION/TRAINING PROGRAM
Payer: COMMERCIAL

## 2024-03-20 VITALS
OXYGEN SATURATION: 97 % | RESPIRATION RATE: 16 BRPM | TEMPERATURE: 97.8 F | DIASTOLIC BLOOD PRESSURE: 83 MMHG | SYSTOLIC BLOOD PRESSURE: 113 MMHG | HEART RATE: 55 BPM

## 2024-03-20 DIAGNOSIS — Z12.11 COLON CANCER SCREENING: Primary | ICD-10-CM

## 2024-03-20 LAB — COLONOSCOPY: NORMAL

## 2024-03-20 PROCEDURE — 45378 DIAGNOSTIC COLONOSCOPY: CPT

## 2024-03-20 PROCEDURE — G8918 PT W/O PREOP ORDER IV AB PRO: HCPCS

## 2024-03-20 PROCEDURE — G8907 PT DOC NO EVENTS ON DISCHARG: HCPCS

## 2024-03-20 RX ORDER — FLUMAZENIL 0.1 MG/ML
0.2 INJECTION, SOLUTION INTRAVENOUS
Status: ACTIVE | OUTPATIENT
Start: 2024-03-20 | End: 2024-03-20

## 2024-03-20 RX ORDER — LIDOCAINE 40 MG/G
CREAM TOPICAL
Status: DISCONTINUED | OUTPATIENT
Start: 2024-03-20 | End: 2024-03-22 | Stop reason: HOSPADM

## 2024-03-20 RX ORDER — NALOXONE HYDROCHLORIDE 0.4 MG/ML
0.2 INJECTION, SOLUTION INTRAMUSCULAR; INTRAVENOUS; SUBCUTANEOUS
Status: DISCONTINUED | OUTPATIENT
Start: 2024-03-20 | End: 2024-03-22 | Stop reason: HOSPADM

## 2024-03-20 RX ORDER — FENTANYL CITRATE 50 UG/ML
INJECTION, SOLUTION INTRAMUSCULAR; INTRAVENOUS PRN
Status: DISCONTINUED | OUTPATIENT
Start: 2024-03-20 | End: 2024-03-20 | Stop reason: HOSPADM

## 2024-03-20 RX ORDER — ONDANSETRON 2 MG/ML
4 INJECTION INTRAMUSCULAR; INTRAVENOUS
Status: DISCONTINUED | OUTPATIENT
Start: 2024-03-20 | End: 2024-03-22 | Stop reason: HOSPADM

## 2024-03-20 RX ORDER — PROCHLORPERAZINE MALEATE 10 MG
10 TABLET ORAL EVERY 6 HOURS PRN
Status: DISCONTINUED | OUTPATIENT
Start: 2024-03-20 | End: 2024-03-22 | Stop reason: HOSPADM

## 2024-03-20 RX ORDER — NALOXONE HYDROCHLORIDE 0.4 MG/ML
0.4 INJECTION, SOLUTION INTRAMUSCULAR; INTRAVENOUS; SUBCUTANEOUS
Status: DISCONTINUED | OUTPATIENT
Start: 2024-03-20 | End: 2024-03-22 | Stop reason: HOSPADM

## 2024-03-20 RX ORDER — ONDANSETRON 4 MG/1
4 TABLET, ORALLY DISINTEGRATING ORAL EVERY 6 HOURS PRN
Status: DISCONTINUED | OUTPATIENT
Start: 2024-03-20 | End: 2024-03-22 | Stop reason: HOSPADM

## 2024-03-20 RX ORDER — ONDANSETRON 2 MG/ML
4 INJECTION INTRAMUSCULAR; INTRAVENOUS EVERY 6 HOURS PRN
Status: DISCONTINUED | OUTPATIENT
Start: 2024-03-20 | End: 2024-03-22 | Stop reason: HOSPADM

## 2024-03-20 NOTE — H&P
Pre-Endoscopy History and Physical     Jurgen Morris MRN# [id number]   YOB: 1975 Age: 48 year old     Date of Procedure: 3/20/2024  Primary care provider: Carter Humphreys  Type of Endoscopy: colonoscopy  Reason for Procedure: screening  Type of Anesthesia Anticipated: Moderate Sedation    HPI:    Jurgen is a 48 year old male who will be undergoing the above procedure.      A history and physical has been performed. The patient's medications and allergies have been reviewed. The risks and benefits of the procedure and the sedation options and risks were discussed with the patient.  I specifically discussed about possible sedation medication reaction, bleeding, and one of the more rare complications of perforation.  All questions were answered and informed consent was obtained.      He denies a personal or family history of anesthesia complications or bleeding disorders.     No Known Allergies     Cannot display prior to admission medications because the patient has not been admitted in this contact.       Patient Active Problem List   Diagnosis    Essential hypertension    Tension type headache    Mild hypercholesterolemia    Primary snoring        Past Medical History:   Diagnosis Date    Hypertension     Mild hypercholesterolemia 10/15/2020    Tension type headache 10/24/2019        History reviewed. No pertinent surgical history.    Social History     Tobacco Use    Smoking status: Former     Packs/day: 0     Types: Cigarettes     Quit date:      Years since quittin.2    Smokeless tobacco: Never    Tobacco comments:     Down to 1 cigarette year/lightsmoker 10 years ago   Substance Use Topics    Alcohol use: Yes     Alcohol/week: 5.0 - 8.0 standard drinks of alcohol     Types: 5 - 8 Cans of beer per week       Family History   Problem Relation Age of Onset    Anxiety Disorder Mother     Anxiety Disorder Father     Hypertension Father     Anxiety Disorder Brother     Anxiety  "Disorder Sister        REVIEW OF SYSTEMS:     5 point ROS negative except as noted above in HPI, including Gen., Resp., CV, GI &  system review.      PHYSICAL EXAM:   /84 (Cuff Size: Adult Regular)   Pulse 58   Temp 97.9  F (36.6  C) (Temporal)   Resp 16   SpO2 97%  Estimated body mass index is 25.58 kg/m  as calculated from the following:    Height as of 2/27/24: 1.721 m (5' 7.75\").    Weight as of 2/27/24: 75.8 kg (167 lb).   GENERAL APPEARANCE: healthy, alert, and no distress  MENTAL STATUS: alert  AIRWAY EXAM: Mallampatti Class II (visualization of the soft palate, fauces, and uvula)  RESP: lungs clear to auscultation - no rales, rhonchi or wheezes  CV: regular rates and rhythm, normal S1 S2, no S3 or S4, no murmur, click or rub, and no irregular beats      DIAGNOSTICS:    Not indicated      IMPRESSION   ASA Class 2 - Mild systemic disease        PLAN:       Plan for colonoscopy. We discussed the risks, benefits and alternatives and the patient wished to proceed.    The above has been forwarded to the consulting provider.      Signed Electronically by: JOZEF MCELROY MD  March 20, 2024    "

## 2024-04-03 ENCOUNTER — OFFICE VISIT (OUTPATIENT)
Dept: OPTOMETRY | Facility: CLINIC | Age: 49
End: 2024-04-03
Attending: INTERNAL MEDICINE
Payer: COMMERCIAL

## 2024-04-03 DIAGNOSIS — H52.4 REGULAR ASTIGMATISM OF BOTH EYES WITH PRESBYOPIA: Primary | ICD-10-CM

## 2024-04-03 DIAGNOSIS — H50.15 ALTERNATING EXOTROPIA: ICD-10-CM

## 2024-04-03 DIAGNOSIS — H52.223 REGULAR ASTIGMATISM OF BOTH EYES WITH PRESBYOPIA: Primary | ICD-10-CM

## 2024-04-03 PROCEDURE — 92015 DETERMINE REFRACTIVE STATE: CPT

## 2024-04-03 PROCEDURE — 92004 COMPRE OPH EXAM NEW PT 1/>: CPT

## 2024-04-03 ASSESSMENT — REFRACTION_MANIFEST
OS_AXIS: 170
OS_AXIS: 167
OD_SPHERE: -0.50
OD_CYLINDER: +1.50
OS_SPHERE: -0.25
OD_CYLINDER: +1.50
OD_AXIS: 018
OS_SPHERE: -0.25
OD_AXIS: 015
METHOD_AUTOREFRACTION: 1
OS_CYLINDER: +0.75
OS_ADD: +1.25
OS_CYLINDER: +0.75
OD_SPHERE: -0.75
OD_ADD: +1.25

## 2024-04-03 ASSESSMENT — SLIT LAMP EXAM - LIDS
COMMENTS: NORMAL
COMMENTS: NORMAL

## 2024-04-03 ASSESSMENT — VISUAL ACUITY
OD_SC: 20/40
OD_SC: 20/25
METHOD: SNELLEN - LINEAR
OS_SC: 20/20
OS_SC: 20/25

## 2024-04-03 ASSESSMENT — CONF VISUAL FIELD
OD_SUPERIOR_TEMPORAL_RESTRICTION: 0
OD_SUPERIOR_NASAL_RESTRICTION: 0
OD_NORMAL: 1
OS_INFERIOR_NASAL_RESTRICTION: 0
OS_INFERIOR_TEMPORAL_RESTRICTION: 0
OS_NORMAL: 1
OD_INFERIOR_TEMPORAL_RESTRICTION: 0
OD_INFERIOR_NASAL_RESTRICTION: 0
OS_SUPERIOR_TEMPORAL_RESTRICTION: 0
OS_SUPERIOR_NASAL_RESTRICTION: 0

## 2024-04-03 ASSESSMENT — KERATOMETRY
OD_AXISANGLE2_DEGREES: 105
OS_AXISANGLE2_DEGREES: 41
OS_AXISANGLE_DEGREES: 131
OD_K2POWER_DIOPTERS: 45.25
OS_K2POWER_DIOPTERS: 45.25
OS_K1POWER_DIOPTERS: 45.00
OD_AXISANGLE_DEGREES: 15
OD_K1POWER_DIOPTERS: 44.75

## 2024-04-03 ASSESSMENT — TONOMETRY
OS_IOP_MMHG: 22
IOP_METHOD: TONOPEN
OD_IOP_MMHG: 22

## 2024-04-03 ASSESSMENT — CUP TO DISC RATIO
OS_RATIO: 0.6
OD_RATIO: 0.6

## 2024-04-03 ASSESSMENT — EXTERNAL EXAM - RIGHT EYE: OD_EXAM: NORMAL

## 2024-04-03 ASSESSMENT — EXTERNAL EXAM - LEFT EYE: OS_EXAM: NORMAL

## 2024-04-03 NOTE — LETTER
4/3/2024         RE: Jurgen Morris  [address]  [place] [address]        Dear Colleague,    Thank you for referring your patient, Jurgen Morris, to the [facility]. Please see a copy of my visit note below.    Chief Complaint   Patient presents with     Annual Eye Exam         Last Eye Exam: 1st eye exam   Dilated Previously: No, side effects of dilation explained today    What are you currently using to see?  does not use glasses or contacts       Distance Vision Acuity: Satisfied with vision    Near Vision Acuity: Not satisfied     Eye Comfort: good  Do you use eye drops? : No  Occupation or Hobbies: construction, softball, competitive corn hole     Mila Suraj - Optometric Assistant          Medical, surgical and family histories reviewed and updated 4/3/2024.       OBJECTIVE: See Ophthalmology exam    ASSESSMENT:    ICD-10-CM    1. Regular astigmatism of both eyes with presbyopia  H52.223 REFRACTION    H52.4 EYE EXAM (SIMPLE-NONBILLABLE)     Adult Eye  Referral      2. Alternating exotropia  H50.15 EYE EXAM (SIMPLE-NONBILLABLE)     Adult Eye  Referral          PLAN:     Patient Instructions   Regular astigmatism with presbyopia: Updated glasses prescription for full-time wear.  Patient is a first time glasses wearer; defer filling pending #2.  Monitor annually.  Constant, alternating exotropia: First eye exam today; longstanding since childhood per patient.  No diplopia (+)redress movements on cover test.  Patient mostly bothered by cosmesis.  Refer for strabismus surgery evaluation.       Belgica Fong, SUSAN      Again, thank you for allowing me to participate in the care of your patient.        Sincerely,        Belgica Fong, OD

## 2024-04-03 NOTE — PATIENT INSTRUCTIONS
Regular astigmatism with presbyopia: Updated glasses prescription for full-time wear.  Patient is a first time glasses wearer; defer filling pending #2.  Monitor annually.  Constant, alternating exotropia: First eye exam today; longstanding since childhood per patient.  No diplopia (+)redress movements on cover test.  Patient mostly bothered by cosmesis.  Refer for strabismus surgery evaluation.

## 2024-04-03 NOTE — PROGRESS NOTES
Chief Complaint   Patient presents with    Annual Eye Exam         Last Eye Exam: 1st eye exam   Dilated Previously: No, side effects of dilation explained today    What are you currently using to see?  does not use glasses or contacts       Distance Vision Acuity: Satisfied with vision    Near Vision Acuity: Not satisfied     Eye Comfort: good  Do you use eye drops? : No  Occupation or Hobbies: construction, softball, competitive corn hole     Mila Ruelas - Optometric Assistant          Medical, surgical and family histories reviewed and updated 4/3/2024.       OBJECTIVE: See Ophthalmology exam    ASSESSMENT:    ICD-10-CM    1. Regular astigmatism of both eyes with presbyopia  H52.223 REFRACTION    H52.4 EYE EXAM (SIMPLE-NONBILLABLE)     Adult Eye  Referral      2. Alternating exotropia  H50.15 EYE EXAM (SIMPLE-NONBILLABLE)     Adult Eye  Referral          PLAN:     Patient Instructions   Regular astigmatism with presbyopia: Updated glasses prescription for full-time wear.  Patient is a first time glasses wearer; defer filling pending #2.  Monitor annually.  Constant, alternating exotropia: First eye exam today; longstanding since childhood per patient.  No diplopia (+)redress movements on cover test.  Patient mostly bothered by cosmesis.  Refer for strabismus surgery evaluation.       Belgica Fong, OD

## 2024-06-13 ENCOUNTER — OFFICE VISIT (OUTPATIENT)
Dept: OPHTHALMOLOGY | Facility: CLINIC | Age: 49
End: 2024-06-13
Payer: COMMERCIAL

## 2024-06-13 DIAGNOSIS — H53.10 SUBJECTIVE VISUAL DISTURBANCE: ICD-10-CM

## 2024-06-13 DIAGNOSIS — H53.2 DOUBLE VISION: Primary | ICD-10-CM

## 2024-06-13 DIAGNOSIS — H50.15 ALTERNATING EXOTROPIA: ICD-10-CM

## 2024-06-13 DIAGNOSIS — H52.4 REGULAR ASTIGMATISM OF BOTH EYES WITH PRESBYOPIA: ICD-10-CM

## 2024-06-13 DIAGNOSIS — H52.223 REGULAR ASTIGMATISM OF BOTH EYES WITH PRESBYOPIA: ICD-10-CM

## 2024-06-13 PROCEDURE — 99203 OFFICE O/P NEW LOW 30 MIN: CPT | Performed by: OPHTHALMOLOGY

## 2024-06-13 PROCEDURE — 92060 SENSORIMOTOR EXAMINATION: CPT

## 2024-06-13 PROCEDURE — 99214 OFFICE O/P EST MOD 30 MIN: CPT | Performed by: OPHTHALMOLOGY

## 2024-06-13 PROCEDURE — 92060 SENSORIMOTOR EXAMINATION: CPT | Mod: 26 | Performed by: OPHTHALMOLOGY

## 2024-06-13 PROCEDURE — 92060 SENSORIMOTOR EXAMINATION: CPT | Performed by: OPHTHALMOLOGY

## 2024-06-13 ASSESSMENT — CONF VISUAL FIELD
METHOD: COUNTING FINGERS
OS_SUPERIOR_TEMPORAL_RESTRICTION: 0
OS_SUPERIOR_NASAL_RESTRICTION: 0
OD_INFERIOR_TEMPORAL_RESTRICTION: 0
OS_INFERIOR_TEMPORAL_RESTRICTION: 0
OS_INFERIOR_NASAL_RESTRICTION: 0
OD_INFERIOR_NASAL_RESTRICTION: 0
OD_SUPERIOR_TEMPORAL_RESTRICTION: 0
OD_NORMAL: 1
OD_SUPERIOR_NASAL_RESTRICTION: 0
OS_NORMAL: 1

## 2024-06-13 ASSESSMENT — VISUAL ACUITY
OS_CC+: -1
CORRECTION_TYPE: GLASSES
OS_CC: 20/20
OD_CC: 20/20
METHOD: SNELLEN - LINEAR

## 2024-06-13 ASSESSMENT — SLIT LAMP EXAM - LIDS
COMMENTS: NORMAL
COMMENTS: NORMAL

## 2024-06-13 ASSESSMENT — EXTERNAL EXAM - RIGHT EYE: OD_EXAM: NORMAL

## 2024-06-13 ASSESSMENT — CUP TO DISC RATIO
OD_RATIO: 0.6
OS_RATIO: 0.6

## 2024-06-13 ASSESSMENT — TONOMETRY
IOP_METHOD: ICARE
OS_IOP_MMHG: 14
OD_IOP_MMHG: 15

## 2024-06-13 ASSESSMENT — EXTERNAL EXAM - LEFT EYE: OS_EXAM: NORMAL

## 2024-06-13 NOTE — PROGRESS NOTES
1. Longstanding manifest right exotropia    Patient reports eye drift outward since childhood.  No history of patching or strabismus surgery.  Patient has had rare diplopia in the past. He is eager for reconstructive strabismus surgery. Discussed the risks, benefits, and alternatives of strabismus surgery and patient wishes to proceed. Plan for eye muscle correction, right eye, using fixed suture technique.    Jurgen Morris is a pleasant 48 year old White male who presents to my neuro-ophthalmology clinic today having been referred by Dr. Fong for exotropia.    HPI:    Dane reports his eyes have drifted out since he was a child.  He denies diplopia and has the sensation of using one eye at a time.  He notices his right eye tends to drift out more than left eye; most noticeable when he is tired.  He thinks it is gradually worsening over time and is concerned about cosmesis.    He denies eyelid drooping, or other eye concerns/changes.    He does not recall wearing prism correction (no habitual glasses throughout his life).    No history of patching or previous strabismus surgery.  No family history of strabismus.    He is working at [employer] as a  with [employer].    Independent historians:  Patient    Review of outside testing:  N/A    My interpretation performed today of outside testing:  N/A    Review of outside clinical notes:    4/3/24 -- Visit with Dr. Fong      Past medical history:    Patient Active Problem List   Diagnosis    Essential hypertension    Tension type headache    Mild hypercholesterolemia    Primary snoring       Patient is currently taking amlodipine and lisinopril.  No blood thinners.    Family history / social history:  Patient's family history includes Anxiety Disorder in his brother, father, mother, and sister; Hypertension in his father.     Patient  reports that he quit smoking about 21 years ago. His smoking use included cigarettes. He has never used smokeless  tobacco. He reports current alcohol use of about 5.0 - 8.0 standard drinks of alcohol per week. He reports that he does not use drugs.     Exam:  Corrected distance visual acuity was 20/20 in the right eye and 20/20 -1 in the left eye. Intraocular pressure was 15 in the right eye and 14 in the left eye using ICare.  Color vision 11/11 right eye and 11/11 left eye.  Pupils isocoric with no APD.      Anterior segment exam and undilated fundus exam unremarkable.     Strabismus exam with comitant 25-30 exotropia and 4 RHypertropia in downgaze. Motility essentially full in both eyes.     We discussed in detail the risks, benefits, and alternatives of eye muscle correction surgery including the very rare risk of death or serious morbidity from a general anesthesia complication and the rare risk of severe vision loss in the operative eye(s) secondary to retinal detachment or endophthalmitis.  We discussed more likely sub-optimal outcomes including the unanticipated need for additional strabismus surgery.  Finally the patient was aware that prisms glasses may be required to optimize single vision following surgery.    After a thorough discussion of these risks, the patient decided to proceed with strabismus surgery.  The surgical plan is as follows:     1. Eye muscle correction, right eye     Target 20 prism diopter exotropia correction    Follow-up 1 week after strabismus surgery.    Plan to operate at: [facility]  Prism free glasses for adjustment: Non-adjustable       Precharting only:  Cassidy Hill, MS4  Medical Student, [facility]    Belgica Fong, OD    35 minutes were spent on the date of the encounter by me doing chart review, history and exam, documentation, and further activities as noted above    Complete documentation of historical and exam elements from today's encounter can be found in the full encounter summary report (not reduplicated in this progress note).  I personally obtained the chief  complaint(s) and history of present illness.  I confirmed and edited as necessary the review of systems, past medical/surgical history, family history, social history, and examination findings as documented by others; and I examined the patient myself.  I personally reviewed the relevant tests, images, and reports as documented above.  I formulated and edited as necessary the assessment and plan and discussed the findings and management plan with the patient and family     Tobias Carey MD

## 2024-06-13 NOTE — NURSING NOTE
Chief Complaint(s) and History of Present Illness(es)       Strabismus Evaluation    In both eyes.  Disease is present since childhood.  Characterized as horizontal.  Occurring constantly.  Occurring all the time.  Since onset it is stable.  Associated symptoms include headaches.  Negative for eye pain and blurred vision.  Pain was noted as 0/10.             Comments    Jurgen Morris is a 48 year old male sent for consultation by Dr. Fong for alternating XT.  Dane reports eye drifting since childhood.  Right eye tends to drift out more than left eye. Does not recall doing eye patching.  No previous strabismus surgeries.  Images can be blurry, has to focus and concentrate.  He is interested in strabismus surgery.  He was given an Rx at his last visit but was told to hold off before dispensing glasses.  Have had mild headaches in the past, unsure if related to the eye drifting or not.  YASMEEN Combs 6/13/2024 1:59 PM

## 2024-06-14 ENCOUNTER — TELEPHONE (OUTPATIENT)
Dept: OPHTHALMOLOGY | Facility: CLINIC | Age: 49
End: 2024-06-14

## 2024-06-14 NOTE — TELEPHONE ENCOUNTER
6/20/2024 11:25am LVM requesting a call back to [phone].     6/18/2024 4:05pm LVM requesting a call back to [phone].     6/18/2024 9:29am LVM requesting a call back to [phone].     6/17/2024 4:00pm Patient left a voicemail requesting a call back to schedule his surgery.    6/17/2024 9:25am LVM requesting a call back to [phone].     6/14/2024 9:43am LVM requesting a call back to [phone].

## 2024-06-26 ENCOUNTER — OFFICE VISIT (OUTPATIENT)
Dept: SLEEP MEDICINE | Facility: CLINIC | Age: 49
End: 2024-06-26
Attending: INTERNAL MEDICINE
Payer: COMMERCIAL

## 2024-06-26 VITALS
SYSTOLIC BLOOD PRESSURE: 131 MMHG | HEIGHT: 69 IN | HEART RATE: 60 BPM | WEIGHT: 173 LBS | BODY MASS INDEX: 25.62 KG/M2 | OXYGEN SATURATION: 96 % | DIASTOLIC BLOOD PRESSURE: 87 MMHG

## 2024-06-26 DIAGNOSIS — R06.83 SNORING: Primary | ICD-10-CM

## 2024-06-26 DIAGNOSIS — G47.19 EXCESSIVE DAYTIME SLEEPINESS: ICD-10-CM

## 2024-06-26 DIAGNOSIS — R06.83 PRIMARY SNORING: ICD-10-CM

## 2024-06-26 DIAGNOSIS — R06.81 WITNESSED EPISODE OF APNEA: ICD-10-CM

## 2024-06-26 PROCEDURE — 99203 OFFICE O/P NEW LOW 30 MIN: CPT | Performed by: INTERNAL MEDICINE

## 2024-06-26 ASSESSMENT — SLEEP AND FATIGUE QUESTIONNAIRES
HOW LIKELY ARE YOU TO NOD OFF OR FALL ASLEEP WHILE SITTING AND READING: MODERATE CHANCE OF DOZING
HOW LIKELY ARE YOU TO NOD OFF OR FALL ASLEEP WHILE WATCHING TV: SLIGHT CHANCE OF DOZING
HOW LIKELY ARE YOU TO NOD OFF OR FALL ASLEEP WHILE SITTING AND TALKING TO SOMEONE: SLIGHT CHANCE OF DOZING
HOW LIKELY ARE YOU TO NOD OFF OR FALL ASLEEP WHILE LYING DOWN TO REST IN THE AFTERNOON WHEN CIRCUMSTANCES PERMIT: HIGH CHANCE OF DOZING
HOW LIKELY ARE YOU TO NOD OFF OR FALL ASLEEP WHILE SITTING INACTIVE IN A PUBLIC PLACE: SLIGHT CHANCE OF DOZING
HOW LIKELY ARE YOU TO NOD OFF OR FALL ASLEEP WHEN YOU ARE A PASSENGER IN A CAR FOR AN HOUR WITHOUT A BREAK: SLIGHT CHANCE OF DOZING
HOW LIKELY ARE YOU TO NOD OFF OR FALL ASLEEP IN A CAR, WHILE STOPPED FOR A FEW MINUTES IN TRAFFIC: WOULD NEVER DOZE
HOW LIKELY ARE YOU TO NOD OFF OR FALL ASLEEP WHILE SITTING QUIETLY AFTER LUNCH WITHOUT ALCOHOL: SLIGHT CHANCE OF DOZING

## 2024-06-26 NOTE — PROGRESS NOTES
[facility] [address] [address]  Phone: [phone]    Patient:  Jurgen Morris, Date of birth 1975  Date of Visit:  06/26/2024  Referring Provider Carter EMMANUEL [facility]   Outpatient Sleep Medicine Consultation  June 26, 2024      Name: Jurgen Morris MRN# [id number]   Age: 48 year old YOB: 1975     Date of Consultation: June 26, 2024  Consultation is requested by: Carter Humphreys MD  [address] [place] [address] Carter Humphreys  Primary care provider: Carter Humphreys         Reason for Sleep Consult:     Jurgen Morris is a 48 year old male for complaints of persistent and loud snoring, witnessed apneic episodes and daytime sleepiness over the past 3 to 4 years.         Assessment and Plan:     Summary Sleep Diagnoses:    Sleep disordered breathing.  History and clinical presentation are suggestive with a high pretest probability for obstructive sleep apnea.  Since patient does not have any significant cardiovascular and pulmonary comorbidities, a type III home sleep test is a reasonable diagnostic tool to determine the presence and severity of obstructive sleep apnea.  Excessive daytime sleepiness.  This appears to be multifactorial.  There is suspicion for abnormal quality of sleep due to undiagnosed and untreated obstructive sleep apnea.  He does have subacute to chronic sleep deprivation due to his shiftwork and social commitments with a sleep debt of around 6 hours during his workweek.  Circadian rhythm-shift work disorder.  He works early morning shift for which she is waking up at 4:30 AM.  His nodules wake time is around 7 AM.  The resulting circadian rhythm misalignment can lead to symptoms of sleep inertia, social jet lag and concentration issues.  At present he is overcoming this by excessive use of caffeinated beverages.  The symptoms are further compounded  due to likely abnormal quality of sleep as described above.      Comorbid Diagnoses:    Hypertension.    Summary Recommendations:    Sleep hygiene with focus on consistent sleep and wake time was explained and recommended.  He is advised to maintain at least 7 hours of sleep during his workweek.  The importance of timed light exposure was discussed and it is recommended that he avoid very early morning (before 5 AM) ambient light exposure by using dark classes if possible.  NOx T3 home sleep test to screen for obstructive sleep apnea.  He strongly advised not to operate heavy machinery or drive when drowsy.    Summary Counseling:  See instructions    Counseling included a comprehensive review of diagnostic and therapeutic strategies as well as risks of inadequate therapy.  Educational materials provided in instructions.             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  For his current job, he has to wake up with an alarm at 4:30 AM as his work stopped at 6 AM.  During the workweek, he usually goes to bed around 10 PM and on some days due to his social commitments is unable to make to bed till 11 PM.  During the night he wakes up between 2-4 times at least once to use the bathroom.    He lives with his girlfriend who has been complaining of his loud snoring and has reported to him of witnessing apneic episode quite frequently.  He denies symptoms of hypnagogic, hypnopompic hallucinations, sleep paralysis, he denies symptoms of cataplexy.  He does not have symptoms of orthopnea or paroxysmal nocturnal dyspnea.  On days when he is getting less than 7 hours of sleep, he feels tired and has been consuming between 3-4 caffeinated energy drinks or soda to keep himself  alert.      SLEEP-WAKE SCHEDULE:     Jurgen Morris     -Describes themself as neither a morning or night person;  prefer to go to sleep at 11:00 PM and wake up at 7:00 AM.     -Naps 1-2 times per week for  minutes, feels refreshed after naps; takes frequent inadvertant naps.     Jurgen Morris    -ON WEEKDAYS, goes to sleep at 10:00 PM during the week; awakens  4:30 AM with an alarm; falls asleep in 5 minutes; denies difficulty falling asleep.     -ON WEEKENDS, goes to sleep at 12:30 AM.  He wakes up at 8:00 AM with an alarm; falls asleep in 5 minutes.       -Awakens 2-4 times a night for 5 minutes before falling back to sleep; awakens to go to the bathroom, discomfort and taking his dog out.      He does feel rested on nights he gets 7 or more hours of sleep feel rested in the morning.      SCALES       SLEEP APNEA: Stopbang score: 5         INSOMNIA:  Insomnia severity score: 9       SLEEPINESS: [place] sleepiness scale: 10 [normal < 11]   Drowsy driving/near accidents: Denies.  Consequences: None       PHQ9:     SLEEP COMPLAINTS:  Cardio-respiratory    Snoring: Loud snoring reported nightly/week  Dyspnea: Denies  Morning headaches or confusion: Denies  Coexisting Lung disease: Denies    Coexisting Heart disease: Denies    Does patient have a bed partner: Sleeps with his girlfriend has been complaining of his loud snoring and reporting witnessed apneic episodes.  Has bed partner been sleeping separately because of snoring: Denies            RLS Screen: When you try to relax in the evening or sleep at  night, do you ever have unpleasant, restless feelings in your  legs that can be relieved by walking or movement?  Denies    Periodic limb movement: Denies    Narcolepsy: Denies sudden urges of sleep attacks  Cataplexy: Denies   Sleep paralysis: Denies    Hallucinations: Denies       Sleep Behaviors:  Leg symptoms/movements: Denies  Motor restlessness: Denies  Night terrors: Denies  Bruxism:  Denies  Automatic behaviors: None    Other subjective complaints:  Anxiety or rumination: Denies  Pain and discomfort at  night: Denies  Waking up with heart pounding or racing: Denies  GERD or aspiration: Denies         Parasomnia:   NREM: Denies recurrent persistent confusional arousal, night eating, sleep walking or sleep terrors   REM: Denies dream enactment; injuries          Medications:     Current Outpatient Medications   Medication Sig Dispense Refill    amLODIPine (NORVASC) 10 MG tablet Take 1 tablet (10 mg) by mouth daily 90 tablet 3    lisinopril (ZESTRIL) 10 MG tablet Take 1 tablet (10 mg) by mouth daily 90 tablet 3    bisacodyl (DULCOLAX) 5 MG EC tablet Take 2 tablets at 3 pm the day before your procedure. If your procedure is before 11 am, take 2 additional tablets at 11 pm. If your procedure is after 11 am, take 2 additional tablets at 6 am. For additional instructions refer to your colonoscopy prep instructions. 4 tablet 0    polyethylene glycol (GOLYTELY) 236 g suspension The night before the exam at 6 pm drink an 8-ounce glass every 15 minutes until the jug is half empty. If you arrive before 11 AM: Drink the other half of the Golytely jug at 11 PM night before procedure. If you arrive after 11 AM: Drink the other half of the Golytely jug at 6 AM day of procedure. For additional instructions refer to your colonoscopy prep instructions. 4000 mL 0     No current facility-administered medications for this visit.        No Known Allergies         Past Medical History:     Does not need 02 supplement at night   Past Medical History:   Diagnosis Date    Hypertension     Mild hypercholesterolemia 10/15/2020    Tension type headache 10/24/2019             Past Surgical History:    Previous upper airway surgery: None  Past Surgical History:   Procedure Laterality Date    COLONOSCOPY WITH CO2 INSUFFLATION N/A 3/20/2024    Procedure: Colonoscopy with CO2 insufflation;  Surgeon: Yann Martinez MD;   "Location:  OR            Social History:     Social History     Tobacco Use    Smoking status: Former     Current packs/day: 0.00     Types: Cigarettes     Quit date:      Years since quittin.4    Smokeless tobacco: Never    Tobacco comments:     Down to 1 cigarette year/lightsmoker 10 years ago   Substance Use Topics    Alcohol use: Yes     Alcohol/week: 5.0 - 8.0 standard drinks of alcohol     Types: 5 - 8 Cans of beer per week         Chemical History:     Tobacco: Denies     Uses 1-2 cups/week of coffee. Last caffeine intake is usually around breakfast    Supplements for wakefulness: 1-2 caffeinated sodas, occasional Mountain Dew \"Kick Start\" - 2-3 per week    EtOH: 2-3 light beers per night    Recreational Drugs: Denies    Psych Hx:   Does not have any underlying psychiatric history  Current dangers to self or others:none           Family History:     Family History   Problem Relation Age of Onset    Anxiety Disorder Mother     Anxiety Disorder Father     Hypertension Father     Anxiety Disorder Brother     Anxiety Disorder Sister         Sleep Family Hx:        RLS-no reported history   NANCI -for family members snore but no established diagnosis of obstructive sleep apnea and his family  Insomnia -unaware  Parasomnia -unaware         Review of Systems:     A complete 10 point review of systems was negative other than HPI or as commented below:   Jurgen Morris has gained 10-15 pounds in the last year.      CONSTITUTIONAL: NEGATIVE for weight gain/loss, fever, chills, sweats or night sweats, drug allergies.  EYES: NEGATIVE for changes in vision, blind spots, double vision.  ENT: NEGATIVE for ear pain, sore throat, sinus pain, post-nasal drip, runny nose, bloody nose  CARDIAC: NEGATIVE for fast heartbeats or fluttering in chest, chest pain or pressure, breathlessness when lying flat, swollen legs or swollen feet.  NEUROLOGIC: NEGATIVE headaches, weakness or numbness in the arms or " "legs.  DERMATOLOGIC: NEGATIVE for rashes, new moles or change in mole(s)  PULMONARY: NEGATIVE SOB at rest, SOB with activity, dry cough, productive cough, coughing up blood, wheezing or whistling when breathing.    GASTROINTESTINAL: NEGATIVE for nausea or vomitting, loose or watery stools, fat or grease in stools, constipation, abdominal pain, bowel movements black in color or blood noted.  GENITOURINARY: NEGATIVE for pain during urination, blood in urine, urinating more frequently than usual, irregular menstrual periods.  MUSCULOSKELETAL: NEGATIVE for muscle pain, bone or joint pain, swollen joints.  ENDOCRINE: NEGATIVE for increased thirst or urination, diabetes.  LYMPHATIC: NEGATIVE for swollen lymph nodes, lumps or bumps in the breasts or nipple discharge.         Physical Examination:     /87   Pulse 60   Ht 1.74 m (5' 8.5\")   Wt 78.5 kg (173 lb)   SpO2 96%   BMI 25.92 kg/m    GENERAL APPEARANCE: Well-groomed appearance, appropriately engaged during the interview, alert, oriented x 3, no apparent distress  EYES: Eyes grossly normal to inspection,lids and lashes within normal limits   HENT: Mallampatti 2; mandibular profile micrognathia; dentition normal  NECK: Supple, no adenopathy,no asymmetry,   RESP: Breathing comfortably, no use of accessory respiratory muscles, no audible wheezes  CV: Regular rates and rhythm, audible S1 S2, no S3 or S4, normal murmur  ABDOMEN: soft, nontender, without hepatosplenomegaly or masses,bowel sounds normal  MUSCULOSKELETAL: Normal extremities; no deformities noted  NEURO: Normal gait; intact cranial nerves; normal strength  PSYCH: Normal mentation; normal affect   SKIN: No pitting pedal edema, no hyperpigmented skin changes over exposed areas            Data: All pertinent previous laboratory data reviewed     No results found for: \"PH\", \"PHARTERIAL\", \"PO2\", \"[id number]\", \"SAT\", \"PCO2\", \"HCO3\", \"BASEEXCESS\", \"MICHAEL\", \"BEB\"  No results found for: \"TSH\"  Lab Results " "  Component Value Date     (H) 02/29/2024     (H) 01/31/2022     No results found for: \"HGB\"  Lab Results   Component Value Date    BUN 17.0 02/29/2024    BUN 14 01/31/2022    CR 0.88 02/29/2024    CR 1.01 01/31/2022       Copy to: Carter Humphreys MD 6/26/2024     Total of 40 minutes of time was spent with patient, this includes the interview and exam, and review of the chart/labs/imaging/sleep study/PAP therapy and pertinent clinical data on 06/26/2024. This also includes (greater than 50%) time spent counseling and coordinating care.            "

## 2024-06-26 NOTE — PATIENT INSTRUCTIONS
"          MY TREATMENT INFORMATION FOR SLEEP APNEA-  Jurgen Morris    DOCTOR : Alonzo Mejia MD    Am I having a sleep study at a sleep center?  --->Due to normal delays, you will be contacted within 2-4 weeks to schedule    Am I having a home sleep study?  --->Watch the video for the device you are using:    -/drop off device-   https://www.Glistenube.com/watch?v=yGGFBdELGhk    -Disposable device sent out require phone/computer application-   https://www.Amoobi.com/watch?v=BCce_vbiwxE      Frequently asked questions:  1. What is Obstructive Sleep Apnea (NANCI)? NANCI is the most common type of sleep apnea. Apnea means, \"without breath.\"  Apnea is most often caused by narrowing or collapse of the upper airway as muscles relax during sleep.   Almost everyone has occasional apneas. Most people with sleep apnea have had brief interruptions at night frequently for many years.  The severity of sleep apnea is related to how frequent and severe the events are.   2. What are the consequences of NANCI? Symptoms include: feeling sleepy during the day, snoring loudly, gasping or stopping of breathing, trouble sleeping, and occasionally morning headaches or heartburn at night.  Sleepiness can be serious and even increase the risk of falling asleep while driving. Other health consequences may include development of high blood pressure and other cardiovascular disease in persons who are susceptible. Untreated NANCI  can contribute to heart disease, stroke and diabetes.   3. What are the treatment options? In most situations, sleep apnea is a lifelong disease that must be managed with daily therapy. Medications are not effective for sleep apnea and surgery is generally not considered until other therapies have been tried. Your treatment is your choice . Continuous Positive Airway (CPAP) works right away and is the therapy that is effective in nearly everyone. An oral device to hold your jaw forward is usually the next most " reliable option. Other options include postioning devices (to keep you off your back), weight loss, and surgery including a tongue pacing device. There is more detail about some of these options below.  4. Are my sleep studies covered by insurance? Although we will request verification of coverage, we advise you also check in advance of the study to ensure there is coverage.    Important tips for those choosing CPAP and similar devices  REMEMBER-IF YOU RECEIVE A CALL FROM  [phone]-->IT IS TO SETUP A DEVICE  For new devices, sign up for device GARRET to monitor your device for your followup visits  We encourage you to utilize the [employer] garret or website ( https://[employer].[place]/ ) to monitor your therapy progress and share the data with your healthcare team when you discuss your sleep apnea.                                                    Know your equipment:  CPAP is continuous positive airway pressure that prevents obstructive sleep apnea by keeping the throat from collapsing while you are sleeping. In most cases, the device is  smart  and can slowly self-adjusts if your throat collapses and keeps a record every day of how well you are treated-this information is available to you and your care team.  BPAP is bilevel positive airway pressure that keeps your throat open and also assists each breath with a pressure boost to maintain adequate breathing.  Special kinds of BPAP are used in patients who have inadequate breathing from lung or heart disease. In most cases, the device is  smart  and can slowly self-adjusts to assist breathing. Like CPAP, the device keeps a record of how well you are treated.  Your mask is your connection to the device. You get to choose what feels most comfortable and the staff will help to make sure if fits. Here: are some examples of the different masks that are available: Magnetic mask aids may assist with use but there are safety issues that should be addressed when  considering with magnets* ( see end of discussion).       Key points to remember on your journey with sleep apnea:  Sleep study.  PAP devices often need to be adjusted during a sleep study to show that they are effective and adjusted right.  Good tips to remember: Try wearing just the mask during a quiet time during the day so your body adapts to wearing it. A humidifier is recommended for comfort in most cases to prevent drying of your nose and throat. Allergy medication from your provider may help you if you are having nasal congestion.  Getting settled-in. It takes more than one night for most of us to get used to wearing a mask. Try wearing just the mask during a quiet time during the day so your body adapts to wearing it. A humidifier is recommended for comfort in most cases. Our team will work with you carefully on the first day and will be in contact within 4 days and again at 2 and 4 weeks for advice and remote device adjustments. Your therapy is evaluated by the device each day.   Use it every night. The more you are able to sleep naturally for 7-8 hours, the more likely you will have good sleep and to prevent health risks or symptoms from sleep apnea. Even if you use it 4 hours it helps. Occasionally all of us are unable to use a medical therapy, in sleep apnea, it is not dangerous to miss one night.   Communicate. Call our skilled team on the number provided on the first day if your visit for problems that make it difficult to wear the device. Over 2 out of 3 patients can learn to wear the device long-term with help from our team. Remember to call our team or your sleep providers if you are unable to wear the device as we may have other solutions for those who cannot adapt to mask CPAP therapy. It is recommended that you sleep your sleep provider within the first 3 months and yearly after that if you are not having problems.   Use it for your health. We encourage use of CPAP masks during daytime quiet  periods to allow your face and brain to adapt to the sensation of CPAP so that it will be a more natural sensation to awaken to at night or during naps. This can be very useful during the first few weeks or months of adapting to CPAP though it does not help medically to wear CPAP during wakefulness and  should not be used as a strategy just to meet guidelines.  Take care of your equipment. Make sure you clean your mask and tubing using directions every day and that your filter and mask are replaced as recommended or if they are not working.     *Masks with magnets:  Updated Contraindications  Masks with magnetic components are contraindicated for use by patients where they, or anyone in close physical contact while using the mask, have the following:   Active medical implants that interact with magnets (i.e., pacemakers, implantable cardioverter defibrillators (ICD), neurostimulators, cerebrospinal fluid (CSF) shunts, insulin/infusion pumps)   Metallic implants/objects containing ferromagnetic material (i.e., aneurysm clips/flow disruption devices, embolic coils, stents, valves, electrodes, implants to restore hearing or balance with implanted magnets, ocular implants, metallic splinters in the eye)  Updated Warning  Keep the mask magnets at a safe distance of at least 6 inches (150 mm) away from implants or medical devices that may be adversely affected by magnetic interference. This warning applies to you or anyone in close physical contact with your mask. The magnets are in the frame and lower headgear clips, with a magnetic field strength of up to 400mT. When worn, they connect to secure the mask but may inadvertently detach while asleep.  Implants/medical devices, including those listed within contraindications, may be adversely affected if they change function under external magnetic fields or contain ferromagnetic materials that attract/repel to magnetic fields (some metallic implants, e.g., contact lenses  with metal, dental implants, metallic cranial plates, screws, paige hole covers, and bone substitute devices). Consult your physician and  of your implant / other medical device for information on the potential adverse effects of magnetic fields.    BESIDES CPAP, WHAT OTHER THERAPIES ARE THERE?    Positioning Device  Positioning devices are generally used when sleep apnea is mild and only occurs on your back.This example shows a pillow that straps around the waist. It may be appropriate for those whose sleep study shows milder sleep apnea that occurs primarily when lying flat on one's back. Preliminary studies have shown benefit but effectiveness at home may need to be verified by a home sleep test. These devices are generally not covered by medical insurance.  Examples of devices that maintain sleeping on the back to prevent snoring and mild sleep apnea.    Belt type body positioner  http://Smalldeals/    Electronic reminder  http://nightshifttherapy.[employer]/            Oral Appliance  What is oral appliance therapy?  An oral appliance device fits on your teeth at night like a retainer used after having braces. The device is made by a specialized dentist and requires several visits over 1-2 months before a manufactured device is made to fit your teeth and is adjusted to prevent your sleep apnea. Once an oral device is working properly, snoring should be improved. A home sleep test may be recommended at that time if to determine whether the sleep apnea is adequately treated.       Some things to remember:  -Oral devices are often, but not always, covered by your medical insurance. Be sure to check with your insurance provider.   -If you are referred for oral therapy, you will be given a list of specialized dentists to consider or you may choose to visit the Web site of the American Academy of Dental Sleep Medicine  -Oral devices are less likely to work if you have severe sleep apnea or are extremely  overweight.     More detailed information  An oral appliance is a small acrylic device that fits over the upper and lower teeth  (similar to a retainer or a mouth guard). This device slightly moves jaw forward, which moves the base of the tongue forward, opens the airway, improves breathing for effective treat snoring and obstructive sleep apnea in perhaps 7 out of 10 people .  The best working devices are custom-made by a dental device  after a mold is made of the teeth 1, 2, 3.  When is an oral appliance indicated?  Oral appliance therapy is recommended as a first-line treatment for patients with primary snoring, mild sleep apnea, and for patients with moderate sleep apnea who prefer appliance therapy to use of CPAP4, 5. Severity of sleep apnea is determined by sleep testing and is based on the number of respiratory events per hour of sleep.   How successful is oral appliance therapy?  The success rate of oral appliance therapy in patients with mild sleep apnea is 75-80% while in patients with moderate sleep apnea it is 50-70%. The chance of success in patients with severe sleep apnea is 40-50%. The research also shows that oral appliances have a beneficial effect on the cardiovascular health of NANCI patients at the same magnitude as CPAP therapy7.  Oral appliances should be a second-line treatment in cases of severe sleep apnea, but if not completely successful then a combination therapy utilizing CPAP plus oral appliance therapy may be effective. Oral appliances tend to be effective in a broad range of patients although studies show that the patients who have the highest success are females, younger patients, those with milder disease, and less severe obesity. 3, 6.   Finding a dentist that practices dental sleep medicine  Specific training is available through the American Academy of Dental Sleep Medicine for dentists interested in working in the field of sleep. To find a dentist who is educated in  the field of sleep and the use of oral appliances, near you, visit the Web site of the American Academy of Dental Sleep Medicine.    References  1. Iveth, et al. Objectively measured vs self-reported compliance during oral appliance therapy for sleep-disordered breathing. Chest 2013; 144(5): 2071-4505.  2. Marisa et al. Objective measurement of compliance during oral appliance therapy for sleep-disordered breathing. Thorax 2013; 68(1): 91-96.  3. Dayo et al. Mandibular advancement devices in 620 men and women with NANCI and snoring: tolerability and predictors of treatment success. Chest 2004; 125: 8928-2067.  4. Benson, et al. Oral appliances for snoring and NANCI: a review. Sleep 2006; 29: 244-262.  5. Hilario et al. Oral appliance treatment for NANCI: an update. J Clin Sleep Med 2014; 10(2): 215-227.  6. Bebo et al. Predictors of OSAH treatment outcome. J Dent Res 2007; 86: 3350-2808.      Weight Loss:   Your Body mass index is 25.92 kg/m .    Being overweight does not necessarily mean you will have health consequences.  Those who have BMI over 35 or over 27 with existing medical conditions carries greater risk.   Weight loss decreases severity of sleep apnea in most people with obesity. For those with mild obesity who have developed snoring with weight gain, even 15-30 pound weight loss can improve and occasionally milder eliminate sleep apnea.  Structured and life-long dietary and health habits are necessary to lose weight and keep healthier weight levels.     The Comprehensive Weight loss program offers all aspects of weight loss strategies including two Non-Surgical Weight Loss Programs: Medical Weight Management and our 24 Week Healthy Lifestyle Program:    Medical Weight Management: You will meet with a Medical Weight Management Provider, as well as a Registered Dietician. The program may include medication therapy, dietary education, recommended exercise and physical therapy programs,  monthly support group meetings, and possible psychological counseling. Follow up visits with the provider or dietician are scheduled based on your progress and needs.    24 Week Healthy Lifestyle Program: This unique program is designed to give you the support of weekly appointments and activities thru a 24-week period. It may include all of the components of the basic program (above), with the addition of 11 individual Health  Visits, 24-week access to the [employer] website for over 700 online classes, and monthly support group meetings. This program has an out-of-pocket expense of $499 to cover the items that can not be billed to insurance (health coaches and [facility] access), and is non-refundable/non-transferable (you may be able to use a Health Savings Account; ask your HSA provider). There may be an optional meal replacement plan prescribed as well.   Surgical management achieves meaningful long-term weight loss and improvement in health risks in most patients with more severe obesity.      Sleep Apnea Surgery:    Surgery for obstructive sleep apnea is considered generally only when other therapies fail to work. Surgery may be discussed with you if you are having a difficult time tolerating CPAP and or when there is an abnormal structure that requires surgical correction.  Nose and throat surgeries often enlarge the airway to prevent collapse.  Most of these surgeries create pain for 1-2 weeks and up to half of the most common surgeries are not effective throughout life.  You should carefully discuss the benefits and drawbacks to surgery with your sleep provider and surgeon to determine if it is the best solution for you.   More information  Surgery for NANCI is directed at areas that are responsible for narrowing or complete obstruction of the airway during sleep.  There are a wide range of procedures available to enlarge and/or stabilize the airway to prevent blockage of breathing in the three major  areas where it can occur: the palate, tongue, and nasal regions.  Successful surgical treatment depends on the accurate identification of the factors responsible for obstructive sleep apnea in each person.  A personalized approach is required because there is no single treatment that works well for everyone.  Because of anatomic variation, consultation with an examination by a sleep surgeon is a critical first step in determining what surgical options are best for each patient.  In some cases, examination during sedation may be recommended in order to guide the selection of procedures.  Patients will be counseled about risks and benefits as well as the typical recovery course after surgery. Surgery is typically not a cure for a person s NANCI.  However, surgery will often significantly improve one s NANCI severity (termed  success rate ).  Even in the absence of a cure, surgery will decrease the cardiovascular risk associated with OSA7; improve overall quality of life8 (sleepiness, functionality, sleep quality, etc).      Palate Procedures:  Patients with NANCI often have narrowing of their airway in the region of their tonsils and uvula.  The goals of palate procedures are to widen the airway in this region as well as to help the tissues resist collapse.  Modern palate procedure techniques focus on tissue conservation and soft tissue rearrangement, rather than tissue removal.  Often the uvula is preserved in this procedure. Residual sleep apnea is common in patient after pharyngoplasty with an average reduction in sleep apnea events of 33%2.      Tongue Procedures:  ExamWhile patients are awake, the muscles that surround the throat are active and keep this region open for breathing. These muscles relax during sleep, allowing the tongue and other structures to collapse and block breathing.  There are several different tongue procedures available.  Selection of a tongue base procedure depends on characteristics seen on  physical exam.  Generally, procedures are aimed at removing bulky tissues in this area or preventing the back of the tongue from falling back during sleep.  Success rates for tongue surgery range from 50-62%3.    Hypoglossal Nerve Stimulation:  Hypoglossal nerve stimulation has recently received approval from the [place] Food and Drug Administration for the treatment of obstructive sleep apnea.  This is based on research showing that the system was safe and effective in treating sleep apnea6.  Results showed that the median AHI score decreased 68%, from 29.3 to 9.0. This therapy uses an implant system that senses breathing patterns and delivers mild stimulation to airway muscles, which keeps the airway open during sleep.  The system consists of three fully implanted components: a small generator (similar in size to a pacemaker), a breathing sensor, and a stimulation lead.  Using a small handheld remote, a patient turns the therapy on before bed and off upon awakening.    Candidates for this device must be greater than 18 years of age, have moderate to severe obstructive sleep apnea with less than 25% central events  (AHI between 15-65), BMI less than 35, have tried CPAP/oral appliance for at least 8 weeks without success, and have appropriate upper airway anatomy (determined by a sleep endoscopy performed by Dr. Maico Banda or Dr. Perico Sarabia).    Nasal Procedures:  Nasal obstruction can interfere with nasal breathing during the day and night.  Studies have shown that relief of nasal obstruction can improve the ability of some patients to tolerate positive airway pressure therapy for obstructive sleep apnea1.  Treatment options include medications such as nasal saline, topical corticosteroid and antihistamine sprays, and oral medications such as antihistamines or decongestants. Non-surgical treatments can include external nasal dilators for selected patients. If these are not successful by themselves,  surgery can improve the nasal airway either alone or in combination with these other options.        Combination Procedures:  Combination of surgical procedures and other treatments may be recommended, particularly if patients have more than one area of narrowing or persistent positional disease.  The success rate of combination surgery ranges from 66-80%2,3.    References  Mikael CONTRERAS. The Role of the Nose in Snoring and Obstructive Sleep Apnoea: An Update.  Eur Arch Otorhinolaryngol. 2011; 268: 1365-73.   Rafiq SM; Crystal JA; Jean JR; Pallanch JF; Mil MB; Larry SG; Jalil VALLADARES. Surgical modifications of the upper airway for obstructive sleep apnea in adults: a systematic review and meta-analysis. SLEEP 2010;33(10):5544-0318. Kristina CURRIE. Hypopharyngeal surgery in obstructive sleep apnea: an evidence-based medicine review.  Arch Otolaryngol Head Neck Surg. 2006 Feb;132(2):206-13.  Kenan YH1, Jessica Y, Avinash LUTHER. The efficacy of anatomically based multilevel surgery for obstructive sleep apnea. Otolaryngol Head Neck Surg. 2003 Oct;129(4):327-35.  Kristina CURRIE, Goldberg A. Hypopharyngeal Surgery in Obstructive Sleep Apnea: An Evidence-Based Medicine Review. Arch Otolaryngol Head Neck Surg. 2006 Feb;132(2):206-13.  Clay KLEIN et al. Upper-Airway Stimulation for Obstructive Sleep Apnea.  N Engl J Med. 2014 Jan 9;370(2):139-49.  Jody Y et al. Increased Incidence of Cardiovascular Disease in Middle-aged Men with Obstructive Sleep Apnea. Am J Respir Crit Care Med; 2002 166: 159-165  Garcia EM et al. Studying Life Effects and Effectiveness of Palatopharyngoplasty (SLEEP) study: Subjective Outcomes of Isolated Uvulopalatopharyngoplasty. Otolaryngol Head Neck Surg. 2011; 144: 623-631.        WHAT IF I ONLY HAVE SNORING?    Mandibular advancement devices, lateral sleep positioning, long-term weight loss and treatment of nasal allergies have been shown to improve snoring.  Exercising tongue muscles with a game  (https://[employer]-transfection/us/garret/soundly-reduce-snoring/mh5050924013) or stimulating the tongue during the day with a device (https://doi.org/10.3390/lrb11482330) have improved snoring in some individuals.  https://www.[employer]-transfection/  https://www.sleepfoundation.org/best-anti-snoring-mouthpieces-and-mouthguards    Remember to Drive Safe... Drive Alive     Sleep health profoundly affects your health, mood, and your safety.  Thirty three percent of the population (one in three of us) is not getting enough sleep and many have a sleep disorder. Not getting enough sleep or having an untreated / undertreated sleep condition may make us sleepy without even knowing it. In fact, our driving could be dramatically impaired due to our sleep health. As your provider, here are some things I would like you to know about driving:     Here are some warning signs for impairment and dangerous drowsy driving:              -Having been awake more than 16 hours               -Looking tired               -Eyelid drooping              -Head nodding (it could be too late at this point)              -Driving for more than 30 minutes     Some things you could do to make the driving safer if you are experiencing some drowsiness:              -Stop driving and rest              -Call for transportation              -Make sure your sleep disorder is adequately treated     Some things that have been shown NOT to work when experiencing drowsiness while driving:              -Turning on the radio              -Opening windows              -Eating any  distracting  /  entertaining  foods (e.g., sunflower seeds, candy, or any other)              -Talking on the phone      Your decision may not only impact your life, but also the life of others. Please, remember to drive safe for yourself and all of us.

## 2024-06-29 ENCOUNTER — HEALTH MAINTENANCE LETTER (OUTPATIENT)
Age: 49
End: 2024-06-29

## 2024-08-15 PROBLEM — H53.2 DOUBLE VISION: Status: ACTIVE | Noted: 2024-06-13

## 2024-10-14 ENCOUNTER — OFFICE VISIT (OUTPATIENT)
Dept: SLEEP MEDICINE | Facility: CLINIC | Age: 49
End: 2024-10-14
Attending: INTERNAL MEDICINE
Payer: COMMERCIAL

## 2024-10-14 DIAGNOSIS — R06.83 SNORING: ICD-10-CM

## 2024-10-14 DIAGNOSIS — G47.19 EXCESSIVE DAYTIME SLEEPINESS: ICD-10-CM

## 2024-10-14 DIAGNOSIS — R06.81 WITNESSED EPISODE OF APNEA: ICD-10-CM

## 2024-10-14 PROCEDURE — 95800 SLP STDY UNATTENDED: CPT

## 2024-10-14 NOTE — PROGRESS NOTES
Pt is completing a home sleep test. Pt was instructed on how to put on the Noxturnal T3 device and associated equipment before going to bed and given the opportunity to practice putting it on before leaving the sleep center. Pt was reminded to bring the home sleep test kit back to the center tomorrow, at the scheduled time for download and reporting. Patient was instructed to complete study using the following treatment?  None  Neck circumference: 40 CM / 15.75 inches.  Device number: 01  Sabiha Mercer , Home Sleep Studies Specialist  [place]  / [facility]

## 2024-10-15 ENCOUNTER — DOCUMENTATION ONLY (OUTPATIENT)
Dept: SLEEP MEDICINE | Facility: CLINIC | Age: 49
End: 2024-10-15
Attending: INTERNAL MEDICINE
Payer: COMMERCIAL

## 2024-10-17 NOTE — PROCEDURES
Home Sleep Study Interpretation    Patient: Jurgen Morris  MRN: [id number]  YOB: 1975  Study Date: 10/14/2024  PCP/Referring Provider: Carter Humphreys;  Ordering Provider: Roberto Rosado MD    Indications for Home Study: Jurgen is a 49 Male with a history of hypertension, tension headaches who presents with symptoms suggestive of obstructive sleep apnea      Weight: 173.0 lbs  BMI: 25.9   [place]: 10/24  STOP BAN/8      Data: A full night home sleep study was performed recording the standard physiologic parameters including body position, movement, sound, nasal pressure, thermal oral airflow, chest and abdominal movements with respiratory inductance plethysmography, and oxygen saturation by pulse oximetry. Pulse rate was estimated by oximetry recording. This study was considered adequate based on > 4 hours of quality oximetry and respiratory recording. As specified by the AASM Manual for the Scoring of Sleep and Associated events, version 2.3, Rule VIII.D 1B, 4% oxygen desaturation scoring for hypopneas is used as a standard of care on all home sleep apnea testing.    Analysis Time: 455 minutes    Respiration:  Sleep Associated Hypoxemia: Sustained hypoxemia was not present. Baseline oxygen saturation was 98%. Time with saturation less than 89% was 2 minutes. Time with saturation less than 90% was 5.3 minutes. The lowest oxygen saturation was 85.0%.  Snoring: Snoring was present 49% of the time with an average level of 75 dB. Duration of time snoring above 70 dB was 216.4 minutes.    Respiratory events: The home study revealed a presence of 29 obstructive apneas and 1 mixed and central apneas. There were 60 hypopneas resulting in a combined apnea/hypopnea index [AHI] of 12 events per hour with  25 per hour supine, 28  per hour prone, 0 per hour upright, 25  per hour left side, and 2 per hour right side.    Position: Percent of time spent: Supine 20%, prone 5%, upright 0%, on left 17%, on  right 58%.    Heart Rate: By Pulse Oximetry normal rate was noted.    Assessment:  mild obstructive sleep apnea.  Sleep associated hypoxemia was not present.    Recommendations:  Consider auto-CPAP at 5-15 cmH2O, oral appliance therapy, or positional therapy  Suggest optimizing sleep hygiene and avoiding sleep deprivation  Weight management    Diagnosis Code(s): Obstructive Sleep Apnea G47.33, Snoring R06.83    Electronically signed by: Alonzo Mejia MD October 17, 2024  Diplomate, American Board of Internal Medicine, Sleep Medicine

## 2024-10-17 NOTE — PROGRESS NOTES
This HSAT was performed using a Noxturnal T3 device which recorded snore, sound, movement activity, body position, nasal pressure, oronasal thermal airflow, pulse, oximetry and both chest and abdominal respiratory effort. HSAT data was restricted to the time patient states they were in bed.     HSAT was scored using 1B 4% hypopnea rule.     HST AHI (Non-PAT): 11.9  Snoring was reported as moderate and loud.  Time with SpO2 below 89% was 2 minutes.   Overall signal quality was good     Pt will follow up with sleep provider to determine appropriate therapy.

## 2024-10-22 ENCOUNTER — MYC REFILL (OUTPATIENT)
Dept: FAMILY MEDICINE | Facility: CLINIC | Age: 49
End: 2024-10-22
Payer: COMMERCIAL

## 2024-10-22 DIAGNOSIS — I10 ESSENTIAL HYPERTENSION: ICD-10-CM

## 2024-10-22 RX ORDER — LISINOPRIL 10 MG/1
10 TABLET ORAL DAILY
Qty: 90 TABLET | Refills: 3 | OUTPATIENT
Start: 2024-10-22

## 2024-10-22 RX ORDER — AMLODIPINE BESYLATE 10 MG/1
10 TABLET ORAL DAILY
Qty: 90 TABLET | Refills: 3 | OUTPATIENT
Start: 2024-10-22

## 2024-11-14 ENCOUNTER — VIRTUAL VISIT (OUTPATIENT)
Dept: SLEEP MEDICINE | Facility: CLINIC | Age: 49
End: 2024-11-14
Payer: COMMERCIAL

## 2024-11-14 ENCOUNTER — TELEPHONE (OUTPATIENT)
Dept: OPHTHALMOLOGY | Facility: CLINIC | Age: 49
End: 2024-11-14

## 2024-11-14 VITALS — HEIGHT: 68 IN | WEIGHT: 170 LBS | BODY MASS INDEX: 25.76 KG/M2

## 2024-11-14 DIAGNOSIS — G47.33 OSA (OBSTRUCTIVE SLEEP APNEA): Primary | ICD-10-CM

## 2024-11-14 PROBLEM — R06.83 PRIMARY SNORING: Status: RESOLVED | Noted: 2024-02-27 | Resolved: 2024-11-14

## 2024-11-14 ASSESSMENT — SLEEP AND FATIGUE QUESTIONNAIRES
HOW LIKELY ARE YOU TO NOD OFF OR FALL ASLEEP WHILE SITTING QUIETLY AFTER LUNCH WITHOUT ALCOHOL: SLIGHT CHANCE OF DOZING
HOW LIKELY ARE YOU TO NOD OFF OR FALL ASLEEP WHILE SITTING AND READING: MODERATE CHANCE OF DOZING
HOW LIKELY ARE YOU TO NOD OFF OR FALL ASLEEP WHILE SITTING AND TALKING TO SOMEONE: WOULD NEVER DOZE
HOW LIKELY ARE YOU TO NOD OFF OR FALL ASLEEP WHEN YOU ARE A PASSENGER IN A CAR FOR AN HOUR WITHOUT A BREAK: WOULD NEVER DOZE
HOW LIKELY ARE YOU TO NOD OFF OR FALL ASLEEP IN A CAR, WHILE STOPPED FOR A FEW MINUTES IN TRAFFIC: WOULD NEVER DOZE
HOW LIKELY ARE YOU TO NOD OFF OR FALL ASLEEP WHILE LYING DOWN TO REST IN THE AFTERNOON WHEN CIRCUMSTANCES PERMIT: HIGH CHANCE OF DOZING
HOW LIKELY ARE YOU TO NOD OFF OR FALL ASLEEP WHILE SITTING INACTIVE IN A PUBLIC PLACE: SLIGHT CHANCE OF DOZING
HOW LIKELY ARE YOU TO NOD OFF OR FALL ASLEEP WHILE WATCHING TV: SLIGHT CHANCE OF DOZING

## 2024-11-14 ASSESSMENT — PAIN SCALES - GENERAL: PAINLEVEL_OUTOF10: NO PAIN (0)

## 2024-11-14 NOTE — TELEPHONE ENCOUNTER
Spoke to Jurgen regarding the upcoming surgery with Dr. Carey on 11/18/24. Patient needs a pre-op to continue with surgery.     I offered appointments available at the [facility] for pre-op, today and tomorrow but patient asked to reschedule the procedure instead.    I advised the patient that the eye  would reach out to reschedule.     Alek Armas  Complex Surgery Scheduler  Pediatrics Pulmonary  Pediatrics Hearing & ENT  Pediatrics Aerodigestive  [phone]

## 2024-11-14 NOTE — NURSING NOTE
Current patient location:  [employer] [facility], [facility]    Is the patient currently in the state of [place]? YES    Visit mode:VIDEO    If the visit is dropped, the patient can be reconnected by:VIDEO VISIT: Text to cell phone:   Telephone Information:   Mobile [phone]       Will anyone else be joining the visit? NO  (If patient encounters technical issues they should call [phone]    Are changes needed to the allergy or medication list? No    Are refills needed on medications prescribed by this physician? NO    Rooming Documentation:  VF Required questionnaires complete.    Reason for visit: RECHECK    Jaleesa MERCADOF

## 2024-11-14 NOTE — PROGRESS NOTES
Virtual Visit Details  Type of service: Video Visit   Start time: 9:25 AM  End time: 9:45 AM  Originating Location (pt. Location): Other at work  Distant Location (provider location): Off-site  Platform used for Video Visit: Yulex    Sleep Study Follow-Up Visit:    Date on this visit: 2024    Jurgen Morris comes in today for follow-up of his sleep study done on 10/14/2024 for symptoms suggestive of obstructive sleep apnea.    Home Sleep Study Interpretation     Patient: Jurgen Morris  MRN: [id number]  YOB: 1975  Study Date: 10/14/2024  PCP/Referring Provider: Carter Humphreys;  Ordering Provider: Roberto Rosado MD     Indications for Home Study: Jurgen is a 49 Male with a history of hypertension, tension headaches who presents with symptoms suggestive of obstructive sleep apnea.        Weight: 173.0 lbs  BMI: 25.9   [place]: 10/24  STOP BAN/8        Data: A full night home sleep study was performed recording the standard physiologic parameters including body position, movement, sound, nasal pressure, thermal oral airflow, chest and abdominal movements with respiratory inductance plethysmography, and oxygen saturation by pulse oximetry. Pulse rate was estimated by oximetry recording. This study was considered adequate based on > 4 hours of quality oximetry and respiratory recording. As specified by the AASM Manual for the Scoring of Sleep and Associated events, version 2.3, Rule VIII.D 1B, 4% oxygen desaturation scoring for hypopneas is used as a standard of care on all home sleep apnea testing.     Analysis Time: 455 minutes     Respiration:  Sleep Associated Hypoxemia: Sustained hypoxemia was not present. Baseline oxygen saturation was 98%. Time with saturation less than 89% was 2 minutes. Time with saturation less than 90% was 5.3 minutes. The lowest oxygen saturation was 85.0%.  Snoring: Snoring was present 49% of the time with an average level of 75 dB. Duration of time  snoring above 70 dB was 216.4 minutes.     Respiratory events: The home study revealed a presence of 29 obstructive apneas and 1 mixed and central apneas. There were 60 hypopneas resulting in a combined apnea/hypopnea index [AHI] of 12 events per hour with  25 per hour supine, 28 per hour prone, 0 per hour upright, 25  per hour left side, and 2 per hour right side.     Position: Percent of time spent: Supine 20%, prone 5%, upright 0%, on left 17%, on right 58%.     Heart Rate: By Pulse Oximetry normal rate was noted.     Assessment:  mild obstructive sleep apnea.  Sleep associated hypoxemia was not present.     Recommendations:  Consider auto-CPAP at 5-15 cmH2O, oral appliance therapy, or positional therapy  Suggest optimizing sleep hygiene and avoiding sleep deprivation  Weight management     Diagnosis Code(s): Obstructive Sleep Apnea G47.33, Snoring R06.83     Electronically signed by: Alonzo Mejia MD October 17, 2024  Diplomate, American Board of Internal Medicine, Sleep Medicine    These findings were reviewed with patient.     Past medical/surgical history, family history, social history, medications and allergies were reviewed.      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 The results of his sleep study were reviewed in depth. Informed Jurgen his study showed mild obstructive sleep apnea without sleep associated hypoxemia. We reviewed treatment options for mild NANCI including CPAP or oral appliance therapy. Jurgen elected to try CPAP.  Plan:  Comprehensive DME  An order was placed for Auto-PAP 5-15 cm H2O. We reviewed compliance goals, mask exchange policy, and patient was enrolled in [facility]. His comorbid hypertension qualifies him for CPAP therapy for his mild NANCI.      He will follow up with me in about 3 month(s) to document compliance.     Total time spent reviewing medical records, history and physical examination, review of previous testing and interpretation as well as documentation on this date: 33 minutes     RADHA Andersen CNP    CC: Carter Humphreys

## 2024-11-14 NOTE — PATIENT INSTRUCTIONS
I placed the order for a CPAP machine. That routes electronically to [facility]. They will contact you in the next 1-2 weeks to schedule an appointment to get the device.     A few things to know about starting CPAP:  CPAP machines are often rent-to-own over 3-12 months depending on your insurance. During the first 3 months, insurances will often want to see at least 4 hours of use on 70% of nights over a 30 day period. Ideally, you would wear it whenever sleeping, but 4 hours is where health benefits really start.   If you don't like the first mask you get, you can exchange it once in the first month for free. Otherwise, insurance will cover new supplies about every 3 months. They will give you paperwork explaining how often to clean and replace parts when you get the machine.  We have people called our sleep therapy management team who will be checking in on you in the first month. They can access data from your machine and help troubleshoot any problems you may have.    Insurances sometimes require a follow up in the 2-3 month range. Please call [phone] to schedule.    RADHA Andersen CNP    [facility] LOCATIONS:     SHOWROOM LOCATIONS:    [facility]   [address]  Phone: [phone], option 2, option 1 customer service    [facility]   [address]  [place] [address]  Phone: [phone]    [facility]  [address], [address]  [place] [address]  Phone: [phone]    [facility]  [address]  [place], [place]  Phone: [phone]    [facility]  [address], [address]  [place] [address]  Phone: [phone]    [facility]  [address], [address]  [place], [place]  Phone: [phone]    Business hours 8A-4:30P

## 2024-11-26 ENCOUNTER — DOCUMENTATION ONLY (OUTPATIENT)
Dept: SLEEP MEDICINE | Facility: CLINIC | Age: 49
End: 2024-11-26
Payer: COMMERCIAL

## 2024-11-26 DIAGNOSIS — G47.33 OBSTRUCTIVE SLEEP APNEA: Primary | ICD-10-CM

## 2024-11-26 NOTE — PROGRESS NOTES
Patient was offered choice of vendor and chose [facility].  Patient Jurgen Morris was set up at [place] on November 26, 2024. Patient received a Resmed Airsense 11 Pressures were set at  5-15 cm H2O.   Patient s ramp is 5 cm H2O for Auto and FLEX/EPR is 2.  Patient received a Resmed Mask name: AIRFIT P30I  Pillow mask size Standard, heated tubing and heated humidifier.    Cassi Ceja

## 2024-12-02 ENCOUNTER — DOCUMENTATION ONLY (OUTPATIENT)
Dept: SLEEP MEDICINE | Facility: CLINIC | Age: 49
End: 2024-12-02
Payer: COMMERCIAL

## 2024-12-02 NOTE — PROGRESS NOTES
3 day Sleep therapy management telephone visit    Diagnostic AHI:    HST: 11.9        LEFT VOICE MESSAGE FOR PATIENT TO RETURN CALL      Order settings:  CPAP MIN CPAP MAX   5 cm H2O 15 cm H2O         Device settings:  CPAP MIN CPAP MAX EPR RESMED SOFT RESPONSE SETTING   5.0 cm  H20 15.0 cm  H20 TWO OFF         Patient has the following upcoming sleep appts:      Replacement device: No  STM ordered by provider: Yes     Total time spent on accessing and  interpreting remote patient PAP therapy data  10 minutes    Total time spent counseling, coaching  and reviewing PAP therapy data with patient  0 minutes

## 2025-04-19 DIAGNOSIS — I10 ESSENTIAL HYPERTENSION: ICD-10-CM

## 2025-04-21 RX ORDER — AMLODIPINE BESYLATE 10 MG/1
10 TABLET ORAL DAILY
Qty: 90 TABLET | Refills: 3 | OUTPATIENT
Start: 2025-04-21

## 2025-04-21 RX ORDER — LISINOPRIL 10 MG/1
10 TABLET ORAL DAILY
Qty: 90 TABLET | Refills: 3 | OUTPATIENT
Start: 2025-04-21

## 2025-07-13 ENCOUNTER — HEALTH MAINTENANCE LETTER (OUTPATIENT)
Age: 50
End: 2025-07-13

## (undated) DEVICE — PREP CHLORAPREP 26ML TINTED ORANGE  260815

## (undated) DEVICE — PAD CHUX UNDERPAD 23X24" 7136

## (undated) DEVICE — KIT ENDO FIRST STEP DISINFECTANT 200ML W/POUCH EP-4

## (undated) RX ORDER — FENTANYL CITRATE 50 UG/ML
INJECTION, SOLUTION INTRAMUSCULAR; INTRAVENOUS
Status: DISPENSED
Start: 2024-03-20